# Patient Record
Sex: FEMALE | Race: WHITE | Employment: PART TIME | URBAN - METROPOLITAN AREA
[De-identification: names, ages, dates, MRNs, and addresses within clinical notes are randomized per-mention and may not be internally consistent; named-entity substitution may affect disease eponyms.]

---

## 2022-03-11 ENCOUNTER — HOSPITAL ENCOUNTER (OUTPATIENT)
Dept: PHYSICAL THERAPY | Age: 57
Discharge: HOME | End: 2022-03-11
Payer: OTHER GOVERNMENT

## 2022-03-11 PROCEDURE — 97161 PT EVAL LOW COMPLEX 20 MIN: CPT

## 2022-03-11 PROCEDURE — 97110 THERAPEUTIC EXERCISES: CPT

## 2022-03-11 NOTE — PROGRESS NOTES
PHYSICAL THERAPY EVALUATION / DAILY TREATMENT      Patient Name: Sheba Suarez  Date: 3/11/2022  : 1965  [x] Patient  Verified  Payor:  / Plan: Susan Patient / Product Type:  /    In Time: 10:45  Out Time: 11:55  Total Treatment Time (min): 70  Visit #: 1 of 12    Medicare / Hedy Patricio Only   Total Timed Codes (min):  40 1:1 Treatment Time:  70     Treatment Area: Pain in right knee [M25.561]    SUBJECTIVE:  Chief Complaint/Mechanism of Injury:   Patient is a very pleasant 61 y.o. female with c/o right knee pain and distal right thigh that began about 10 years ago after a soccer incident in which she fractured her distal right femur, but her pain has worsened over the last 1-2 years. Patient describes her former femur fracture as \"splitting from the bottom up\" and states it was treated non-surgically. Patient c/o pain and/or difficulty with walking > 20 mins, going up/down stairs (descents are more difficult and she does step to pattern), and sit <> stands. Patient states she has to walk at a slower pace because of her pain. Patient also states she feels like her balance has gotten worse over the past year, but she denies any falls though she does report several stumbles over the past year. She also reports difficulty with rolling over in bed, stating she's not able to tolerate pressure along the inside of her right knee. Static standing, such as when she's waiting in a grocery store line, is also painful and she states she can only tolerate about 20 minutes as well. 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 Patient states doing transfers with her client does tend to bother her right knee as well as her lower back. Her hobbies include yoga, pilates, and doing low-impact workout to fitness DVD's. Patient states she tries to workout 3x/week.   Patient states she received an injection about a month ago, which she states has helped with some of the inflammation as well as some of the pain. Pain Level (out of 0-10 scale): pain ranges from 3-8, currently 5/10  [x] constant, [] intermittent, [] improving, [x] worsening, [] no change since onset  Alleviating Factors: icing, Aleve or Advil prn, topical creams  Previous Treatment for Current Injury: prior skilled PT many years ago, compression stocking, different knee braces, injection last month  Diagnostic Imaging for Current Injury: x-rays - \"bone on bone\" along medial right knee  Living Situation: lives with family in a 2 story home with 0 steps to enter, [] with handrail  Hand Dominance: [x] right handed, [] left handed, [] ambidextrous    Comorbidities: right distal femur fracture 10 years ago (treated non-surgically), chronic right knee pain, left tibial shaft fracture 11 years ago (treated non-surgically), hypothyroid, bilateral carpal tunnel release, scoliosis, chronic LBP, partial thyroidectomy  Substance Use: [] tobacco use, [x] alcohol use, [] other:   Prior Level of Function: right knee pain/distal thigh pain x 10 year s/p right femur fracture; yoga, pilates, and doing low-impact workout to fitness DVD's 3x/week  [x] Unrestricted with functional activities and ADLs  [x] No assistive device  [] active lifestyle, [x] moderately active lifestyle, [] sedentary lifestyle    Patients Goals:  \"to strengthen my knee and leg, and hopefully reduce pain\"    Potential Barriers for PT: [] pain, [] financial, [] time, [] transportation, [] other:  Motivation: Good  Cognition: A&O x 3  Denies Red Flags: SOB, chest pain, dizziness/lightheadedness, blurred/double vision, HA, chills/fevers, night sweats, change in bowel/bladder control, abdominal pain, difficulty swallowing, slurred speech, unexplained weight gain/loss, nausea, and/or vomiting.   Any medication changes, allergies to medications, adverse drug reactions, diagnosis change, or new procedure performed?: [x] No     [] Yes (see summary sheet for update)      OBJECTIVE/EXAMINATION:    30 min [x] Eval                  [] Re-Evaluation     40 min Therapeutic Exercise:  [x] See flow sheet :   Rationale: patient education of knee anatomy and muscle strengthening; therapeutic exercises to improve LE strength, core stabilization, reduce pain, and improve overall function        With   [x] TE   [] TA   [] Neuro   [] Other: Patient Education: [x] Review HEP    [] Progressed/Changed HEP based on:   [] positioning   [] body mechanics   [] transfers   [] heat/ice application    [] other:      Physical Therapy Evaluation:    Inspection:   [x] Patient in no apparent distress                   [] Patient in obvious distress    Posture: fair posture with loss of cervical lordosis and forward shoulders, scoliosis with left lateral shift  [] kyphosis, [] scoliosis, [] lateral shift to the: [] right, [] left    Gait:  [x] No AD, [] SPC, [] Hurry Cane, [] FWW, [] RW, [] Rollator  [x] WNL  [] Abnormal Gait Mechanics    Palpation: TTP along right medial knee joint line and right MCL, otherwise normal palpation including normal patella mobility    AROM (degrees): WNL    MMT: Grossly 5/5 bilaterally except bilateral right quad 4+/5, hip abduction 4-/5, left hip ext with knee bent 4-/5, right hip ext with knee bent 4/5    Special Tests: (-) hamstring 90/90, (-) Nimco's; TUG test = 6 seconds and steady; 30\" STS test = 11 reps, without use of hands    Other Comments: none     Pain Level (out of 0-10 scale) Post Treatment: 5      ASSESSMENT:  Patient is a very pleasant 61 y.o. female presenting to skilled PT with c/o right knee pain and distal right thigh that began about 10 years ago after a soccer incident in which she fractured her distal right femur, but her pain has worsened over the last 1-2 years. Patient describes her former femur fracture as \"splitting from the bottom up\" and states it was treated non-surgically.   Patient c/o pain and/or difficulty with walking > 20 mins, going up/down stairs (descents are more difficult and she does step to pattern), and sit <> stands. Patient states she has to walk at a slower pace because of her pain. Patient also states she feels like her balance has gotten worse over the past year, but she denies any falls though she does report several stumbles over the past year. She also reports difficulty with rolling over in bed, stating she's not able to tolerate pressure along the inside of her right knee. Static standing, such as when she's waiting in a grocery store line, is also painful and she states she can only tolerate about 20 minutes as well. Patient presents today with mild weakness of her right quadriceps as well as moderate weakness of bilateral glutes, which is likely in contribution to her pain and limitations with functional activities as mentioned above. Patient would benefit from skilled PT services to modify and progress therapeutic interventions, address functional mobility deficits, address ROM deficits, address strength deficits, analyze and address soft tissue restrictions, analyze and cue movement patterns, analyze and modify body mechanics/ergonomics, and assess and modify postural abnormalities to attain remaining goals. Patient did well with today's evaluation and initiation of treatment. Patient was educated in [facility] and all questions were answered. An HEP was prescribed and reviewed with the patient today, see chart for copy of HEP. [x]  See Plan of Care  []  See Progress Note/Recertification  []  See Discharge Summary         GOALS:  Short Term Goals: to be accomplished within 2 weeks:    Patient will report compliance with prescribed HEP at least 1x/day in order to assist in progression towards goals and restore functional mobility.   Eval: HEP to be issued and explained to patient within 1-2 visits  Current: same as eval      Long Term Goals: to be accomplished within 6 weeks:    Patient will score at least 63 points on FOTO in order to maximize function and promote patient satisfaction with overall outcome. (Emily Boning is an established functional score where 100 = no disability)  Eval: 55  Current: same as eval    Patient will demonstrate at least 5/5 strength bilaterally in order to be able to safely perform functional activities. Eval: Grossly 5/5 bilaterally except bilateral right quad 4+/5, hip abduction 4-/5, left hip ext with knee bent 4-/5, right hip ext with knee bent 4/5  Current: same as eval    Patient will less than or equal to 2/10 pain during all functional activities and ADLs in order to improve QOL and return to patient's PLOF. Eval: pain ranges from 3-8, currently 5/10; limited in standing/walking > 20 mins, difficulty with sit <> stands and stair descents  Current: same as eval    Patient will be able to negotiate a full flight of stairs with a step-through pattern and less than or equal to 2/10 pain in order to improve safety and return patient to her PLOF. Eval: ascents step-through, descents step-to; up to 5/10 pain during descents   Current: same as eval    Patient will be able to perform at least 15 reps of sit <> stands during 30\" STS test to demonstrate improved LE strength and stability during this functional activity, thus reducing the patient's risk of falls. Eval: 11 reps, without use of arms   Current: same as eval        PLAN  [x]  Continue Plan of Care  []  Upgrade Activities as Tolerated       [x]  Update Interventions per Flow Sheet, as Tolerated by Patient       []  Discharge Due To:   []  Other: Thank you for the referral to [facility]. Karla Gloria, PT, DPT, ATR     3/11/2022     8:10 AM      Patient Name: Dick Jean     Patient : 1965

## 2022-03-11 NOTE — PROGRESS NOTES
[facility] at [address] [place] [address]  Phone: [phone]   Fax: [phone]    Plan of Care/ Statement of Necessity for Physical Therapy Services     Patient name: Radha Dick Start of Care: 3/11/2022   Referral source: Dianna Dowling MD : 1965    Medical Diagnosis: Pain in right knee [M25.561]   Onset Date:chronic x 10 years    Treatment Diagnosis: right knee patellofemoral syndrome/DJD   Prior Hospitalization: see medical history Provider#: [id number]   Medications: Verified on Patient summary List    right distal femur fracture 10 years ago (treated non-surgically), chronic right knee pain, left tibial shaft fracture 11 years ago (treated non-surgically), hypothyroid, bilateral carpal tunnel release, scoliosis, chronic LBP, partial thyroidectomy  Substance Use: []? tobacco use, [x]? alcohol use, []? other:   Prior Level of Function: right knee pain/distal thigh pain x 10 year s/p right femur fracture; yoga, pilates, and doing low-impact workout to fitness DVD's 3x/week  [x]? Unrestricted with functional activities and ADLs  [x]? No assistive device  []? active lifestyle, [x]? moderately active lifestyle, []? sedentary lifestyle     Patients Goals:  \"to strengthen my knee and leg, and hopefully reduce pain\"    The Plan of Care and following information is based on the information from the initial evaluation. Assessment/ key information: Patient is a very pleasant 61 y.o. female presenting to skilled PT with c/o right knee pain and distal right thigh that began about 10 years ago after a soccer incident in which she fractured her distal right femur, but her pain has worsened over the last 1-2 years. Patient describes her former femur fracture as \"splitting from the bottom up\" and states it was treated non-surgically.   Patient c/o pain and/or difficulty with walking > 20 mins, going up/down stairs (descents are more difficult and she does step to pattern), and sit <> stands. Patient states she has to walk at a slower pace because of her pain. Patient also states she feels like her balance has gotten worse over the past year, but she denies any falls though she does report several stumbles over the past year. She also reports difficulty with rolling over in bed, stating she's not able to tolerate pressure along the inside of her right knee. Static standing, such as when she's waiting in a grocery store line, is also painful and she states she can only tolerate about 20 minutes as well. Patient presents today with mild weakness of her right quadriceps as well as moderate weakness of bilateral glutes, which is likely in contribution to her pain and limitations with functional activities as mentioned above. Patient would benefit from skilled PT services to modify and progress therapeutic interventions, address functional mobility deficits, address ROM deficits, address strength deficits, analyze and address soft tissue restrictions, analyze and cue movement patterns, analyze and modify body mechanics/ergonomics, and assess and modify postural abnormalities to attain remaining goals.   Evaluation Complexity History MEDIUM  Complexity : 1-2 comorbidities / personal factors will impact the outcome/ POC ; Examination MEDIUM Complexity : 3 Standardized tests and measures addressing body structure, function, activity limitation and / or participation in recreation  ;Presentation LOW Complexity : Stable, uncomplicated  ;Clinical Decision Making MEDIUM Complexity : FOTO score of 26-74  Overall Complexity Rating: LOW   Problem List: pain affecting function, decrease strength, impaired gait/ balance, decrease ADL/ functional abilitiies, decrease activity tolerance and decrease transfer abilities   Treatment Plan may include any combination of the following: Therapeutic exercise, Therapeutic activities, Neuromuscular re-education, Physical agent/modality, Gait/balance training, Manual therapy, Aquatic therapy, Patient education, Functional mobility training, Home safety training and Stair training  Patient / Family readiness to learn indicated by: asking questions, trying to perform skills and interest  Persons(s) to be included in education: patient (P)  Barriers to Learning/Limitations: None  Measures taken if barriers to learning: n/a  Patient Self Reported Health Status: good  Rehabilitation Potential: good    Goals:  Short Term Goals: to be accomplished within 2 weeks:     Patient will report compliance with prescribed HEP at least 1x/day in order to assist in progression towards goals and restore functional mobility. Eval: HEP to be issued and explained to patient within 1-2 visits  Current: same as eval        Long Term Goals: to be accomplished within 6 weeks:     Patient will score at least 63 points on FOTO in order to maximize function and promote patient satisfaction with overall outcome. (Leanord Mario is an established functional score where 100 = no disability)  Eval: 55  Current: same as eval     Patient will demonstrate at least 5/5 strength bilaterally in order to be able to safely perform functional activities. Eval: Grossly 5/5 bilaterally except bilateral right quad 4+/5, hip abduction 4-/5, left hip ext with knee bent 4-/5, right hip ext with knee bent 4/5  Current: same as eval     Patient will less than or equal to 2/10 pain during all functional activities and ADLs in order to improve QOL and return to patient's PLOF. Eval: pain ranges from 3-8, currently 5/10; limited in standing/walking > 20 mins, difficulty with sit <> stands and stair descents  Current: same as eval     Patient will be able to negotiate a full flight of stairs with a step-through pattern and less than or equal to 2/10 pain in order to improve safety and return patient to her PLOF.               Eval: ascents step-through, descents step-to; up to 5/10 pain during descents Current: same as eval     Patient will be able to perform at least 15 reps of sit <> stands during 30\" STS test to demonstrate improved LE strength and stability during this functional activity, thus reducing the patient's risk of falls. Eval: 11 reps, without use of arms              Current: same as eval    Frequency / Duration: Patient to be seen 2 times per week for 6 weeks. Patient/ Caregiver education and instruction: Diagnosis, prognosis, exercises   [x]  Plan of care has been reviewed with PTA    Daryl Kerr, PT 3/11/2022 8:10 AM  _____________________________________________________________________  I certify that the above Therapy Services are being furnished while the patient is under my care. I agree with the treatment plan and certify that this therapy is necessary.     Physician's Signature:____________Date:_________TIME:________                                      Sunny Neal MD    ** Signature, Date and Time must be completed for valid certification **    Please sign and return to [facility] at [address]  Phone: [phone]   Fax: [phone]

## 2022-03-14 ENCOUNTER — HOSPITAL ENCOUNTER (OUTPATIENT)
Dept: PHYSICAL THERAPY | Age: 57
Discharge: HOME | End: 2022-03-14
Payer: OTHER GOVERNMENT

## 2022-03-14 PROCEDURE — 97530 THERAPEUTIC ACTIVITIES: CPT

## 2022-03-14 PROCEDURE — 97110 THERAPEUTIC EXERCISES: CPT

## 2022-03-14 NOTE — PROGRESS NOTES
PT DAILY TREATMENT NOTE    Patient Name: Veronica Ordoñez  Date: 3/14/2022  : 1965  [x]  Patient  Verified  Payor:  / Plan: Jerome Lugo 73 / Product Type:  /    In Time: 10:16  Out Time: 11:08  Total Treatment Time (min): 52  Total Timed Codes (min): 52  1:1 Treatment Time ( [address] only): 55   Visit #:     Treatment Dx: Pain in right knee [M25.561]    SUBJECTIVE  Pre-Treatment Pain Level (0-10 scale): 7    Any medication changes, allergies to medications, adverse drug reactions, diagnosis change, or new procedure performed?: [x] No    [] Yes (see summary sheet for update)    Subjective Functional Status/Changes:  [] No changes reported  Patient states her left knee is a slight bit more painful today. OBJECTIVE    30 min Therapeutic Exercise:  [x] See flow sheet   Rationale: increase ROM, increase strength, and decrease pain to assist in improving patient's ability to perform functional activities and ADLs    12 min Therapeutic Activity:  [x] See flow sheet   Rationale: increase ROM, increase strength, and improve coordination to assist in improving patient's ability to perform functional activities and ADLs    4 min Neuromuscular Re-Education:  [x] See flow sheet   Rationale: improve coordination, improve balance/proprioception, improve posture, and improve core stabilization to assist in improving patient's ability to perform functional activities and ADLs as well as to improve core stabilization during static/dynamic movements      With   [x] TE   [] TA   [] neuro   [] other: Patient Education: [x] Review HEP    [] Progressed/Changed HEP based on:   [] positioning   [] body mechanics   [] transfers   [] heat/ice application    [] other:      Other Objective/Functional Measures: N/A     Pain Level (0-10 scale) Post Treatment: 5    ASSESSMENT/Changes in Function:  Patient responded well to today's treatment without any adverse reactions.   Patient did well with today's exercise additions, including demonstrating good balance/control during SLS and tandem stances. Decreased pain reported post-treatment. Patient will continue to benefit from skilled PT services to further modify and progress therapeutic interventions, address functional mobility deficits, address ROM deficits, address strength deficits, analyze and address soft tissue restrictions, analyze and cue movement patterns, analyze and modify body mechanics/ergonomics, assess and modify postural abnormalities, and instruct in home and community integration to attain remaining goals. [x]  See Plan of Care  []  See Progress Note/Recertification  []  See Discharge Summary         Progress Towards Goals/Updated Goals:    Short Term Goals: to be accomplished within 2 weeks:     Patient will report compliance with prescribed HEP at least 1x/day in order to assist in progression towards goals and restore functional mobility. Eval: HEP to be issued and explained to patient within 1-2 visits  Current: Patient report daily compliance with her HEP.     3/14/22, met        Long Term Goals: to be accomplished within 6 weeks:     Patient will score at least 63 points on FOTO in order to maximize function and promote patient satisfaction with overall outcome.  (FOTO is an established functional score where 100 = no disability)  Eval: 55  Current: same as eval     Patient will demonstrate at least 5/5 strength bilaterally in order to be able to safely perform functional activities. Eval: Grossly 5/5 bilaterally except bilateral right quad 4+/5, hip abduction 4-/5, left hip ext with knee bent 4-/5, right hip ext with knee bent 4/5  Current: same as eval     Patient will less than or equal to 2/10 pain during all functional activities and ADLs in order to improve QOL and return to patient's PLOF.   Eval: pain ranges from 3-8, currently 5/10; limited in standing/walking > 20 mins, difficulty with sit <> stands and stair descents  Current: same as eval     Patient will be able to negotiate a full flight of stairs with a step-through pattern and less than or equal to 2/10 pain in order to improve safety and return patient to her PLOF.             Eval: ascents step-through, descents step-to; up to 5/10 pain during descents              Current: same as eval     Patient will be able to perform at least 15 reps of sit <> stands during 30\" STS test to demonstrate improved LE strength and stability during this functional activity, thus reducing the patient's risk of falls.              Eval: 11 reps, without use of arms              PHELQRW: same as eval    PLAN  []  Upgrade Activities as Tolerated     [x]  Continue Plan of Care  []  Update Interventions per Flow Sheet       []  Discharge Due To:_  []  Other:_      Karla Moscoso.  Juani PT, DPT, ATR  3/14/2022 8:09 AM    Future Appointments   Date Time Provider Toña Cheema   3/14/2022 10:15 AM Karla Gloria PT [facility]   3/17/2022  3:15 PM [facility] [facility]   3/22/2022  1:00 PM EZEQUIEL Rosales [facility]   3/24/2022  3:15 PM Karla Gloria PT [facility]   3/28/2022 11:00 AM Karla Gloria PT [facility] [facility]   3/30/2022 11:45 AM Wendy Sparks PT [facility]

## 2022-03-17 ENCOUNTER — HOSPITAL ENCOUNTER (OUTPATIENT)
Dept: PHYSICAL THERAPY | Age: 57
Discharge: HOME | End: 2022-03-17
Payer: OTHER GOVERNMENT

## 2022-03-17 PROCEDURE — 97110 THERAPEUTIC EXERCISES: CPT

## 2022-03-17 PROCEDURE — 97530 THERAPEUTIC ACTIVITIES: CPT

## 2022-03-17 PROCEDURE — 97112 NEUROMUSCULAR REEDUCATION: CPT

## 2022-03-17 NOTE — PROGRESS NOTES
PT DAILY TREATMENT NOTE - [facility]     Patient Name: Tatiana Torres  Date:3/17/2022  : 1965  [x]  Patient  Verified  Payor:  / Plan: Jerome Lugo 73 / Product Type:  /    In time:1515  Out time:1615  Total Treatment Time (min): 60  Total Timed Codes (min): 60   1:1 Treatment Time ([facility] only): 60   Visit #: 3 of 12    Treatment Area: Pain in right knee [M25.561]    SUBJECTIVE  Pain Level (0-10 scale): 3-4  Any medication changes, allergies to medications, adverse drug reactions, diagnosis change, or new procedure performed?: [x] No    [] Yes (see summary sheet for update)  Subjective functional status/changes:   [] No changes reported    Patient reports compliance with initial HEP. OBJECTIVE    35 min Therapeutic Exercise:  [x] See flow sheet :   Rationale: increase ROM, increase strength and decrease pain to improve the patients ability to complete ADLs    15 min Therapeutic Activity:  [x]  See flow sheet :   Rationale: increase ROM, increase strength and improve coordination  to improve the patients ability to complete ADLs     10 min Neuromuscular Re-education:  [x]  See flow sheet :   Rationale: improve coordination, improve balance and increase proprioception  to improve the patients ability to complete ADLs          With   [x] TE   [] TA   [] neuro   [] other: Patient Education: [x] Review HEP    [] Progressed/Changed HEP based on:   [] positioning   [] body mechanics   [] transfers   [] heat/ice application    [] other:      Other Objective/Functional Measures: NA     Pain Level (0-10 scale) post treatment: 3    ASSESSMENT/Changes in Function: Patient responds well to treatment session. Provided visual feedback via mirror during sit to stand exercise to promote even weight distribution. She responded well to visual feedback demonstrating improved mechanics. Patient was challenged with activities that recruit posterior chain.  Patient demonstrated reduced strength of the right gastroc. Introduced calf raises added them to HEP. Will advance exercise as tolerated. No adverse effects were noted from today's treatment session     Patient will continue to benefit from skilled PT services to modify and progress therapeutic interventions, address functional mobility deficits, address ROM deficits, address strength deficits, analyze and address soft tissue restrictions, analyze and cue movement patterns, analyze and modify body mechanics/ergonomics, assess and modify postural abnormalities, address imbalance and instruct in home and community integration to attain remaining goals. []  See Plan of Care  []  See progress note/recertification  []  See Discharge Summary         Progress towards goals / Updated goals:  Short Term Goals: to be accomplished within 2 weeks:     Patient will report compliance with prescribed HEP at least 1x/day in order to assist in progression towards goals and restore functional mobility. Eval: HEP to be issued and explained to patient within 1-2 visits  Current: Patient report daily compliance with her HEP.     3/14/22, met        Long Term Goals: to be accomplished within 6 weeks:     Patient will score at least 63 points on FOTO in order to maximize function and promote patient satisfaction with overall outcome.  (FOTO is an established functional score where 100 = no disability)  Eval: 55  Current: same as eval     Patient will demonstrate at least 5/5 strength bilaterally in order to be able to safely perform functional activities. Eval: Grossly 5/5 bilaterally except bilateral right quad 4+/5, hip abduction 4-/5, left hip ext with knee bent 4-/5, right hip ext with knee bent 4/5  Current: same as eval     Patient will less than or equal to 2/10 pain during all functional activities and ADLs in order to improve QOL and return to patient's PLOF.   Eval: pain ranges from 3-8, currently 5/10; limited in standing/walking > 20 mins, difficulty with sit <> stands and stair descents  Current: same as eval     Patient will be able to negotiate a full flight of stairs with a step-through pattern and less than or equal to 2/10 pain in order to improve safety and return patient to her PLOF.               Eval: ascents step-through, descents step-to; up to 5/10 pain during descents              Current: same as eval     Patient will be able to perform at least 15 reps of sit <> stands during 30\" STS test to demonstrate improved LE strength and stability during this functional activity, thus reducing the patient's risk of falls.              Eval: 11 reps, without use of arms              HARKPQU: same as eval    PLAN  []  Upgrade activities as tolerated     [x]  Continue plan of care  []  Update interventions per flow sheet       []  Discharge due to:_  []  Other:_      Shakira Driscoll, PT, DPT 3/17/2022  3:15 PM    Future Appointments   Date Time Provider Toña Cheema   3/22/2022  1:00 PM Phi Doll PT [facility]   3/24/2022  3:15 PM Mallory Gloria, PT [facility]   3/30/2022 11:45 AM Ginette Zurita PT [facility]   4/1/2022  2:30 PM Mallory Gloria, PT [facility]   4/5/2022  3:15 PM Phi Doll PT [facility]   4/7/2022  3:15 PM Mallory Gloria, PT [facility]   4/12/2022  3:15 PM Phi Doll PT [facility]

## 2022-03-22 ENCOUNTER — HOSPITAL ENCOUNTER (OUTPATIENT)
Dept: PHYSICAL THERAPY | Age: 57
Discharge: HOME | End: 2022-03-22
Payer: OTHER GOVERNMENT

## 2022-03-22 PROCEDURE — 97530 THERAPEUTIC ACTIVITIES: CPT

## 2022-03-22 PROCEDURE — 97110 THERAPEUTIC EXERCISES: CPT

## 2022-03-22 NOTE — PROGRESS NOTES
PT DAILY TREATMENT NOTE - [facility]     Patient Name: Yuri Gibbs  Date:3/22/2022  : 1965  [x]  Patient  Verified  Payor: LORENA / Plan: Aure Zendejas / Product Type:  /    In time:1300  Out time:1345  Total Treatment Time (min): 45  Total Timed Codes (min): 45   Visit #: 4 of 12    Treatment Area: Pain in right knee [M25.561]    SUBJECTIVE  Pain Level (0-10 scale): 5  Any medication changes, allergies to medications, adverse drug reactions, diagnosis change, or new procedure performed?: [x] No    [] Yes (see summary sheet for update)  Subjective functional status/changes:   [] No changes reported    Patient reports that she has more knee pain today. OBJECTIVE    30 min Therapeutic Exercise:  [x] See flow sheet :   Rationale: increase ROM, increase strength and decrease pain to improve the patients ability to complete ADLs    15 min Therapeutic Activity:  [x]  See flow sheet :   Rationale: increase ROM, increase strength and improve coordination  to improve the patients ability to complete ADLs        With   [x] TE   [] TA   [] neuro   [] other: Patient Education: [x] Review HEP    [] Progressed/Changed HEP based on:   [] positioning   [] body mechanics   [] transfers   [] heat/ice application    [] other:      Other Objective/Functional Measures: NA     Pain Level (0-10 scale) post treatment: 0    ASSESSMENT/Changes in Function: Patient responds well to treatment session. Patient had c/o anterior knee pain with bridges. Had her perform hip adduction with bridge and she had fewer symptoms. Reviewed hoe walking program and provided handout. Will advance exercise as tolerated.  No adverse effects were noted from today's treatment session      Patient will continue to benefit from skilled PT services to modify and progress therapeutic interventions, address functional mobility deficits, address ROM deficits, address strength deficits, analyze and address soft tissue restrictions, analyze and cue movement patterns, analyze and modify body mechanics/ergonomics, assess and modify postural abnormalities, address imbalance and instruct in home and community integration to attain remaining goals. []  See Plan of Care  []  See progress note/recertification  []  See Discharge Summary         Progress towards goals / Updated goals:  Short Term Goals: to be accomplished within 2 weeks:     Patient will report compliance with prescribed HEP at least 1x/day in order to assist in progression towards goals and restore functional mobility. Eval: HEP to be issued and explained to patient within 1-2 visits  Current: Patient report daily compliance with her HEP.     3/14/22, met        Long Term Goals: to be accomplished within 6 weeks:     Patient will score at least 63 points on FOTO in order to maximize function and promote patient satisfaction with overall outcome.  (FOTO is an established functional score where 100 = no disability)  Eval: 55  Current: same as eval     Patient will demonstrate at least 5/5 strength bilaterally in order to be able to safely perform functional activities. Eval: Grossly 5/5 bilaterally except bilateral right quad 4+/5, hip abduction 4-/5, left hip ext with knee bent 4-/5, right hip ext with knee bent 4/5  Current: same as eval     Patient will less than or equal to 2/10 pain during all functional activities and ADLs in order to improve QOL and return to patient's PLOF. Eval: pain ranges from 3-8, currently 5/10; limited in standing/walking > 20 mins, difficulty with sit <> stands and stair descents  Current: same as eval     Patient will be able to negotiate a full flight of stairs with a step-through pattern and less than or equal to 2/10 pain in order to improve safety and return patient to her PLOF.               Eval: ascents step-through, descents step-to; up to 5/10 pain during descents              Current: same as eval     Patient will be able to perform at least 15 reps of sit <> stands during 30\" STS test to demonstrate improved LE strength and stability during this functional activity, thus reducing the patient's risk of falls.              Eval: 11 reps, without use of arms              Current: same as eval    PLAN  []  Upgrade activities as tolerated     [x]  Continue plan of care  []  Update interventions per flow sheet       []  Discharge due to:_  []  Other:_      Abdiel Felipe, PT, DPT 3/22/2022  12:47 PM    Future Appointments   Date Time Provider Toña Cheema   3/22/2022  1:00 PM Selestine Copa, PT [facility]   3/24/2022  3:15 PM Cesario Gloria, PT [facility]   3/30/2022 11:45 AM George Desai, PT [facility]   4/1/2022  2:30 PM Cesario Gloria, PT [facility]   4/5/2022  3:15 PM Selestine Copa, PT [facility]   4/7/2022  3:15 PM Cesario Gloria, PT [facility]   4/12/2022  3:15 PM Selestine Armidaa, PT [facility]

## 2022-03-24 ENCOUNTER — HOSPITAL ENCOUNTER (OUTPATIENT)
Dept: PHYSICAL THERAPY | Age: 57
Discharge: HOME | End: 2022-03-24
Payer: OTHER GOVERNMENT

## 2022-03-24 PROCEDURE — 97110 THERAPEUTIC EXERCISES: CPT

## 2022-03-24 PROCEDURE — 97112 NEUROMUSCULAR REEDUCATION: CPT

## 2022-03-24 PROCEDURE — 97530 THERAPEUTIC ACTIVITIES: CPT

## 2022-03-24 NOTE — PROGRESS NOTES
PT DAILY TREATMENT NOTE    Patient Name: Precious Weaver  Date: 3/24/2022  : 1965  [x]  Patient  Verified  Payor:  / Plan: [employer] / Product Type:  /    In Time: 4:46  Out Time: 5:35  Total Treatment Time (min): 49  Total Timed Codes (min): 49  1:1 Treatment Time ( [address] only): 55   Visit #:     Treatment Dx: Pain in right knee [M25.561]    SUBJECTIVE  Pre-Treatment Pain Level (0-10 scale): 5    Any medication changes, allergies to medications, adverse drug reactions, diagnosis change, or new procedure performed?: [x] No    [] Yes (see summary sheet for update)    Subjective Functional Status/Changes:  [] No changes reported  Patient states she can now negotiate a full flight of stairs with a step-through pattern. However, she continues to have increased right knee pain when walking around her neighborhood for about a 1/4 mile. Patient states she overall feels about 25% improvement thus far since starting physical therapy.     OBJECTIVE    23 min Therapeutic Exercise:  [x] See flow sheet   Rationale: increase ROM, increase strength, and decrease pain to assist in improving patient's ability to perform functional activities and ADLs     15 min Therapeutic Activity:  [x] See flow sheet   Rationale: increase ROM, increase strength, and improve coordination to assist in improving patient's ability to perform functional activities and ADLs    8 min Neuromuscular Re-Education:  [x] See flow sheet   Rationale: improve coordination, improve balance/proprioception, improve posture, and improve core stabilization to assist in improving patient's ability to perform functional activities and ADLs as well as to improve core stabilization during static/dynamic movements      With   [x] TE   [] TA   [] neuro   [] other: Patient Education: [x] Review HEP    [] Progressed/Changed HEP based on:   [] positioning   [] body mechanics   [] transfers   [] heat/ice application    [] other: Other Objective/Functional Measures: N/A     Pain Level (0-10 scale) Post Treatment: 3    ASSESSMENT/Changes in Function:  Patient responded well to today's treatment without any adverse reactions. Patient is with reports of improving function during stair negotiations and is making steady progress towards goals. She was able to transition onto an Airex pad during SLS bilaterally today with minimal sway noted. Bilateral LE strength appears to be progressing as noted by bettering performance and/or higher resistance/weights during exercises. Patient will continue to benefit from skilled PT services to further modify and progress therapeutic interventions, address functional mobility deficits, address ROM deficits, address strength deficits, analyze and address soft tissue restrictions, analyze and cue movement patterns, analyze and modify body mechanics/ergonomics, assess and modify postural abnormalities, and instruct in home and community integration to attain remaining goals. [x]  See Plan of Care  []  See Progress Note/Recertification  []  See Discharge Summary         Progress Towards Goals/Updated Goals:    Short Term Goals: to be accomplished within 2 weeks:    Patient will report compliance with prescribed HEP at least 1x/day in order to assist in progression towards goals and restore functional mobility. Eval: HEP to be issued and explained to patient within 1-2 visits  Current: Patient report daily compliance with her HEP.     3/14/22, met     Long Term Goals: to be accomplished within 6 weeks:     Patient will score at least 63 points on FOTO in order to maximize function and promote patient satisfaction with overall outcome.  (FOTO is an established functional score where 100 = no disability)  Eval: 55  Current: same as eval     Patient will demonstrate at least 5/5 strength bilaterally in order to be able to safely perform functional activities.   Eval: Grossly 5/5 bilaterally except bilateral right quad 4+/5, hip abduction 4-/5, left hip ext with knee bent 4-/5, right hip ext with knee bent 4/5  Current: same as eval     Patient will less than or equal to 2/10 pain during all functional activities and ADLs in order to improve QOL and return to patient's PLOF. Eval: pain ranges from 3-8, currently 5/10; limited in standing/walking > 20 mins, difficulty with sit <> stands and stair descents  Current: same as eval     Patient will be able to negotiate a full flight of stairs with a step-through pattern and less than or equal to 2/10 pain in order to improve safety and return patient to her PLOF.             Eval: ascents step-through, descents step-to; up to 5/10 pain during descents              Current: can now negotiate a full flight of stairs with a step-through pattern, but with 4/10 pain level     3/24/22, in progress     Patient will be able to perform at least 15 reps of sit <> stands during 30\" STS test to demonstrate improved LE strength and stability during this functional activity, thus reducing the patient's risk of falls.              Eval: 11 reps, without use of arms              [id number]: same as eval    PLAN  []  Upgrade Activities as Tolerated     [x]  Continue Plan of Care  []  Update Interventions per Flow Sheet       []  Discharge Due To:_  []  Other:_      Maynor Dunham.  EZEQUIEL Gloria, DPT, [address] [place] [address]  3/24/2022 3:17 PM    Future Appointments   Date Time Provider Toña Cheema   3/24/2022  4:45 PM Tran Mcguire Im, PT [facility]   3/30/2022 11:45 AM EZEQUIEL BerriosHPPATRICIA [facility]   4/1/2022  2:30 PM Maynor Gloria, PT [facility]   4/5/2022  3:15 PM Raquel Mckoy PT [facility]   4/7/2022  3:15 PM Maynor Gloria Im, PT [facility]   4/12/2022  3:15 PM Raquel Mckoy, PT [facility]

## 2022-03-28 ENCOUNTER — APPOINTMENT (OUTPATIENT)
Dept: PHYSICAL THERAPY | Age: 57
End: 2022-03-28
Payer: OTHER GOVERNMENT

## 2022-03-30 ENCOUNTER — HOSPITAL ENCOUNTER (OUTPATIENT)
Dept: PHYSICAL THERAPY | Age: 57
Discharge: HOME | End: 2022-03-30
Payer: OTHER GOVERNMENT

## 2022-03-30 PROCEDURE — 97530 THERAPEUTIC ACTIVITIES: CPT

## 2022-03-30 PROCEDURE — 97110 THERAPEUTIC EXERCISES: CPT

## 2022-03-30 PROCEDURE — 97112 NEUROMUSCULAR REEDUCATION: CPT

## 2022-03-30 NOTE — PROGRESS NOTES
PT DAILY TREATMENT NOTE    Patient Name: Karthikeyan Siu  Date:3/30/2022  : 1965  [x]  Patient  Verified  Payor: LORENA / Plan: Jerome Lugo 74 / Product Type:  /    In time: 4482  Out time: 3886  Total Treatment Time (min): 59  Total Timed Codes (min): 59  1:1 Treatment Time ( only): 47   Visit #: 6 of 12    Treatment Dx: Pain in right knee [M25.561]    SUBJECTIVE  Pain Level (0-10 scale): 4  Any medication changes, allergies to medications, adverse drug reactions, diagnosis change, or new procedure performed?: [x] No    [] Yes (see summary sheet for update)  Subjective functional status/changes:   [] No changes reported  \"The knee is still sore, but not as much as it was. \"    OBJECTIVE    30 min Therapeutic Exercise:  [x] See flow sheet :   Rationale: increase ROM, increase strength and decrease pain to improve the patients ability to complete ADLs  ambulation safety and efficiency in order to improve patient's ability to safely ambulate at home for self care. 14 min Therapeutic Activity:  [x]  See flow sheet :   Rationale: increase ROM, increase strength and improve coordination  to improve the patients ability to Complete ADLS     10 min Neuromuscular Re-education:  [x]  See flow sheet :   Rationale: improve coordination, improve balance and increase proprioception  to improve the patients ability to complete ADLS, and decrease falls risk    With   [] TE   [] TA   [] neuro   [] other: Patient Education: [x] Review HEP    [] Progressed/Changed HEP based on:   [] positioning   [] body mechanics   [] transfers   [] heat/ice application    [] other:      Other Objective/Functional Measures: NA     Pain Level (0-10 scale) post treatment: 3-4    ASSESSMENT/Changes in Function: Patient has tendency to perform exercises rapidly and does respond well to cueing to decrease pace and increase control. Patient responds well to treatment session.   No adverse effects were noted from today's treatment session. Patient will continue to benefit from skilled PT services to modify and progress therapeutic interventions, address functional mobility deficits, address ROM deficits, address strength deficits, analyze and address soft tissue restrictions, analyze and cue movement patterns, analyze and modify body mechanics/ergonomics, assess and modify postural abnormalities,  and instruct in home and community integration to attain remaining goals. [x]  See Plan of Care  []  See progress note/recertification  []  See Discharge Summary         Progress towards goals / Updated goals:  Short Term Goals: to be accomplished within 2 weeks:     Patient will report compliance with prescribed HEP at least 1x/day in order to assist in progression towards goals and restore functional mobility. Eval: HEP to be issued and explained to patient within 1-2 visits  Current: Patient report daily compliance with her HEP.     3/14/22, met     Long Term Goals: to be accomplished within 6 weeks:     Patient will score at least 63 points on FOTO in order to maximize function and promote patient satisfaction with overall outcome.  (FOTO is an established functional score where 100 = no disability)  Eval: 55  Current: remains 55, but verbally describes functional improvement. 3/30/2022, in progress     Patient will demonstrate at least 5/5 strength bilaterally in order to be able to safely perform functional activities. Eval: Grossly 5/5 bilaterally except bilateral right quad 4+/5, hip abduction 4-/5, left hip ext with knee bent 4-/5, right hip ext with knee bent 4/5  Current: same as eval     Patient will less than or equal to 2/10 pain during all functional activities and ADLs in order to improve QOL and return to patient's PLOF.   Eval: pain ranges from 3-8, currently 5/10; limited in standing/walking > 20 mins, difficulty with sit <> stands and stair descents  Current: same as eval     Patient will be able to negotiate a full flight of stairs with a step-through pattern and less than or equal to 2/10 pain in order to improve safety and return patient to her PLOF.               Eval: ascents step-through, descents step-to; up to 5/10 pain during descents              Current: can now negotiate a full flight of stairs with a step-through pattern, but with 4/10 pain level     3/24/22, in progress     Patient will be able to perform at least 15 reps of sit <> stands during 30\" STS test to demonstrate improved LE strength and stability during this functional activity, thus reducing the patient's risk of falls.              Eval: 11 reps, without use of arms              EPRAIGA: same as eval    PLAN  []  Upgrade activities as tolerated     [x]  Continue plan of care  []  Update interventions per flow sheet       []  Discharge due to:_  []  Other:_      Brenna Shah, PT 3/30/2022  12:15 PM    Future Appointments   Date Time Provider Toña Cheema   4/1/2022  2:30 PM Elvia Gloria, EZEQUIEL SANDRA [facility]   4/5/2022  3:15 PM EZEQUIEL MckeonTNARDA [facility]   4/7/2022  3:15 PM Elvia Gloria, EZEQUIEL SANDRA [facility]   4/12/2022  3:15 PM EZEQUIEL MckeonTNARDA [facility]

## 2022-04-05 ENCOUNTER — HOSPITAL ENCOUNTER (OUTPATIENT)
Dept: PHYSICAL THERAPY | Age: 57
Discharge: HOME | End: 2022-04-05
Payer: OTHER GOVERNMENT

## 2022-04-05 PROCEDURE — 97530 THERAPEUTIC ACTIVITIES: CPT

## 2022-04-05 PROCEDURE — 97112 NEUROMUSCULAR REEDUCATION: CPT

## 2022-04-05 PROCEDURE — 97110 THERAPEUTIC EXERCISES: CPT

## 2022-04-05 NOTE — PROGRESS NOTES
PT DAILY TREATMENT NOTE - [facility]     Patient Name: Jaron Pina  Date:2022  : 1965  [x]  Patient  Verified  Payor:  / Plan: Jerome Lugo 73 / Product Type:  /    In time:1315  Out time:1610  Total Treatment Time (min): 55  Total Timed Codes (min): 55     Visit #: 8 of 15    Treatment Area: Pain in right knee [M25.561]    SUBJECTIVE  Pain Level (0-10 scale): 5  Any medication changes, allergies to medications, adverse drug reactions, diagnosis change, or new procedure performed?: [x] No    [] Yes (see summary sheet for update)  Subjective functional status/changes:   [] No changes reported    Patient reports that she climbed a lot of stairs yesterday and she had a lot of knee pain in the evening. She reports that she has less pain today but still has soreness. OBJECTIVE    35 min Therapeutic Exercise:  [x] See flow sheet :   Rationale: increase ROM, increase strength and decrease pain to improve the patients ability to complete ADLs    10 min Therapeutic Activity:  [x]  See flow sheet :   Rationale: increase ROM, increase strength and improve coordination  to improve the patients ability to complete ADLs     10 min Neuromuscular Re-education:  [x]  See flow sheet :   Rationale: improve coordination, improve balance and increase proprioception  to improve the patients ability to complete ADLs          With   [x] TE   [] TA   [] neuro   [] other: Patient Education: [x] Review HEP    [] Progressed/Changed HEP based on:   [] positioning   [] body mechanics   [] transfers   [] heat/ice application    [] other:      Other Objective/Functional Measures: NA     Pain Level (0-10 scale) post treatment: 2    ASSESSMENT/Changes in Function: Patient responds well to treatment session. Advanced exercise intensity as patient is progressing well. She was challenged with dynamic stability via BOSU ball activities. Provided cues to promote proper joint mechanics with TRX squats.  Will continue to advance exercise as tolerated. No adverse effects were noted from today's treatment session    Patient will continue to benefit from skilled PT services to modify and progress therapeutic interventions, address functional mobility deficits, address ROM deficits, address strength deficits, analyze and address soft tissue restrictions, analyze and cue movement patterns, analyze and modify body mechanics/ergonomics, assess and modify postural abnormalities, address imbalance and instruct in home and community integration to attain remaining goals. []  See Plan of Care  []  See progress note/recertification  []  See Discharge Summary         Progress towards goals / Updated goals:     Short Term Goals: to be accomplished within 2 weeks:     Patient will report compliance with prescribed HEP at least 1x/day in order to assist in progression towards goals and restore functional mobility. Eval: HEP to be issued and explained to patient within 1-2 visits  Current: Patient report daily compliance with her HEP.     3/14/22, met     Long Term Goals: to be accomplished within 6 weeks:     Patient will score at least 63 points on FOTO in order to maximize function and promote patient satisfaction with overall outcome.  (FOTO is an established functional score where 100 = no disability)  Eval: 55  Current: remains 55, but patient verbally describes functional improvement       3/30/22, in progress     Patient will demonstrate at least 5/5 strength bilaterally in order to be able to safely perform functional activities.   Eval: Grossly 5/5 bilaterally except bilateral right quad 4+/5, hip abduction 4-/5, left hip ext with knee bent 4-/5, right hip ext with knee bent 4/5  Current: grossly 5/5 bilaterally except right hip abduction 4-/5, left hip abduction 4/5    4/1/22, in progress     Patient will less than or equal to 2/10 pain during all functional activities and ADLs in order to improve QOL and return to patient's PLOF. Eval: pain ranges from 3-8, currently 5/10; limited in standing/walking > 20 mins, difficulty with sit <> stands and stair descents  Current: pain ranges from 3-7/10, able to stand/walk up to 30 minutes, improving ability with sit <> stands and stair descents     4/1/22, in progress     Patient will be able to negotiate a full flight of stairs with a step-through pattern and less than or equal to 2/10 pain in order to improve safety and return patient to her PLOF.             Eval: ascents step-through, descents step-to; up to 5/10 pain during descents              Current: can now negotiate a full flight of stairs with a step-through pattern, but with 3/10 pain level     4/1/22, in progress     Patient will be able to perform at least 15 reps of sit <> stands during 30\" STS test to demonstrate improved LE strength and stability during this functional activity, thus reducing the patient's risk of falls.              Eval: 11 reps, without use of arms              HYISJAJ: 12 reps, without use of arm     4/1/22, in progress     Summary of Care/ Key Functional Changes:   Patient responded well to today's treatment without any adverse reactions. Patient has participated in 7 visits of skilled PT over the past 3 weeks, and is making steady progress towards goals. Patient does still c/o right knee pain that ranges from 3-7/10, particularly during prolonged walking/standing and stair negotiations, but pain is gradually decreasing. Her bilateral LE strength has improved, albeit she does still have weakness of her core, proximal hips, and glutes. Patient would continue to benefit from skilled PT 2x/week x 4 weeks to further improve LE strength, core stabilization, walking/stair tolerance and ability, balance, and overall function, while decreasing pain.      Patient will continue to benefit from skilled PT services to further modify and progress therapeutic interventions, address functional mobility deficits, address ROM deficits, address strength deficits, analyze and address soft tissue restrictions, analyze and cue movement patterns, analyze and modify body mechanics/ergonomics, assess and modify postural abnormalities, and instruct in home and community integration to attain remaining goals.     PLAN  []  Upgrade activities as tolerated     [x]  Continue plan of care  []  Update interventions per flow sheet       []  Discharge due to:_  []  Other:_      Sondra Lemus, PT, DPT 4/5/2022  3:06 PM    Future Appointments   Date Time Provider Toña Cheema   4/5/2022  3:15 PM Gina Herrera, PT BO[facility]   4/7/2022  3:15 PM David Gloria, EZEQUIEL SANDRA [facility]   4/12/2022  3:15 PM [facility]   4/15/2022 12:30 PM [facility] PT VICTORY [facility]   4/19/2022 11:00 AM EZEQUIEL Garrido [facility]   4/22/2022  2:30 PM Eusebio Segovia [facility]   4/26/2022  4:00 PM Gina Herrera, PT BOAZ [facility]   4/29/2022  3:15 PM Ynes King THE [facility]

## 2022-04-07 ENCOUNTER — HOSPITAL ENCOUNTER (OUTPATIENT)
Dept: PHYSICAL THERAPY | Age: 57
Discharge: HOME | End: 2022-04-07
Payer: OTHER GOVERNMENT

## 2022-04-07 PROCEDURE — 97112 NEUROMUSCULAR REEDUCATION: CPT

## 2022-04-07 PROCEDURE — 97530 THERAPEUTIC ACTIVITIES: CPT

## 2022-04-07 PROCEDURE — 97110 THERAPEUTIC EXERCISES: CPT

## 2022-04-07 NOTE — PROGRESS NOTES
PT DAILY TREATMENT NOTE    Patient Name: Jaron Pina  Date: 2022  : 1965  [x]  Patient  Verified  Payor:  / Plan: Jerome Lugo 73 / Product Type:  /    In Time: 3:16  Out Time: 4:13  Total Treatment Time (min): 57  Total Timed Codes (min): 57  1:1 Treatment Time ( only): 48   Visit #: 9/15    Treatment Dx: Pain in right knee [M25.561]    SUBJECTIVE  Pre-Treatment Pain Level (0-10 scale): 3    Any medication changes, allergies to medications, adverse drug reactions, diagnosis change, or new procedure performed?: [x] No    [] Yes (see summary sheet for update)    Subjective Functional Status/Changes:  [] No changes reported  \"I did a lot of going up and down stairs this past , so Monday I was in like an 8 out of 10 pain. I'm feeling fine now though. Stairs are pretty painful though. \"    OBJECTIVE    15 min Therapeutic Exercise:  [x] See flow sheet   Rationale: increase ROM, increase strength, and decrease pain to assist in improving patient's ability to perform functional activities and ADLs    23 min Therapeutic Activity:  [x] See flow sheet   Rationale: increase ROM, increase strength, and improve coordination to assist in improving patient's ability to perform functional activities and ADLs    15 min Neuromuscular Re-Education:  [x] See flow sheet   Rationale: improve coordination, improve balance/proprioception, improve posture, and improve core stabilization to assist in improving patient's ability to perform functional activities and ADLs as well as to improve core stabilization during static/dynamic movements      With   [x] TE   [] TA   [] neuro   [] other: Patient Education: [x] Review HEP    [] Progressed/Changed HEP based on:   [] positioning   [] body mechanics   [] transfers   [] heat/ice application    [] other:      Other Objective/Functional Measures: N/A     Pain Level (0-10 scale) Post Treatment: 2    ASSESSMENT/Changes in Function:  Patient responded well to today's treatment without any adverse reactions. Patient with no difficulty performing BOSU step up and overs (forward and lateral), but she does report an increase in pain of her right knee when weight is completely through her right LE (though within patients tolerance). She appears to be making steady progress towards goals, continue per POC. Patient will continue to benefit from skilled PT services to further modify and progress therapeutic interventions, address functional mobility deficits, address ROM deficits, address strength deficits, analyze and address soft tissue restrictions, analyze and cue movement patterns, analyze and modify body mechanics/ergonomics, assess and modify postural abnormalities, and instruct in home and community integration to attain remaining goals. [x]  See Plan of Care  []  See Progress Note/Recertification  []  See Discharge Summary         Progress Towards Goals/Updated Goals:    Short Term Goals: to be accomplished within 2 weeks:     Patient will report compliance with prescribed HEP at least 1x/day in order to assist in progression towards goals and restore functional mobility. Eval: HEP to be issued and explained to patient within 1-2 visits  Current: Patient report daily compliance with her HEP.     3/14/22, met     Long Term Goals: to be accomplished within 6 weeks:     Patient will score at least 63 points on FOTO in order to maximize function and promote patient satisfaction with overall outcome.  (FOTO is an established functional score where 100 = no disability)  Eval: 55  Current: remains 55, but patient verbally describes functional improvement       3/30/22, in progress     Patient will demonstrate at least 5/5 strength bilaterally in order to be able to safely perform functional activities.   Eval: Grossly 5/5 bilaterally except bilateral right quad 4+/5, hip abduction 4-/5, left hip ext with knee bent 4-/5, right hip ext with knee bent 4/5  Current: grossly 5/5 bilaterally except right hip abduction 4-/5, left hip abduction 4/5    4/1/22, in progress     Patient will less than or equal to 2/10 pain during all functional activities and ADLs in order to improve QOL and return to patient's PLOF. Eval: pain ranges from 3-8, currently 5/10; limited in standing/walking > 20 mins, difficulty with sit <> stands and stair descents  Current: pain ranges from 3-7/10, able to stand/walk up to 30 minutes, improving ability with sit <> stands and stair descents     4/1/22, in progress     Patient will be able to negotiate a full flight of stairs with a step-through pattern and less than or equal to 2/10 pain in order to improve safety and return patient to her PLOF.             Eval: ascents step-through, descents step-to; up to 5/10 pain during descents              Current: can now negotiate a full flight of stairs with a step-through pattern, but with 3/10 pain level     4/7/22, in progress     Patient will be able to perform at least 15 reps of sit <> stands during 30\" STS test to demonstrate improved LE strength and stability during this functional activity, thus reducing the patient's risk of falls.              Eval: 11 reps, without use of arms              Current: 12 reps, without use of arm     4/1/22, in progress    PLAN  []  Upgrade Activities as Tolerated     [x]  Continue Plan of Care  []  Update Interventions per Flow Sheet       []  Discharge Due To:_  []  Other:_      Agustina Martins.  Juani, PT, DPT, ATR  4/7/2022 9:41 AM    Future Appointments   Date Time Provider Toña Cheema   4/7/2022  3:15 PM EZEQUIEL Vivas [facility]   4/12/2022  3:15 PM EZEQUIEL De Souza THE [facility]   4/15/2022 12:30 PM THE [facility] EZEQUIEL SANDRA [facility]   4/19/2022 11:00 AM EZEQUIEL De Souza [facility]   4/22/2022  2:30 PM Eusebio Van THE [facility]   4/26/2022  4:00 PM Trip Horn PT [facility]   4/29/2022  3:15 PM Tracey King THE [facility]

## 2022-04-12 ENCOUNTER — HOSPITAL ENCOUNTER (OUTPATIENT)
Dept: PHYSICAL THERAPY | Age: 57
Discharge: HOME | End: 2022-04-12
Payer: OTHER GOVERNMENT

## 2022-04-12 PROCEDURE — 97112 NEUROMUSCULAR REEDUCATION: CPT

## 2022-04-12 PROCEDURE — 97110 THERAPEUTIC EXERCISES: CPT

## 2022-04-12 PROCEDURE — 97530 THERAPEUTIC ACTIVITIES: CPT

## 2022-04-12 NOTE — PROGRESS NOTES
PT DAILY TREATMENT NOTE - [facility]     Patient Name: Rehan Michaels  MCKP:  : 1965  [x]  Patient  Verified  Payor: LORENA / Plan: Jerome Lugo 73 / Product Type:  /    In time:1520  Out time:1620  Total Treatment Time (min): 60  Total Timed Codes (min): 60   Visit #: 10 of 15    Treatment Area: Pain in right knee [M25.561]    SUBJECTIVE  Pain Level (0-10 scale): 4  Any medication changes, allergies to medications, adverse drug reactions, diagnosis change, or new procedure performed?: [x] No    [] Yes (see summary sheet for update)  Subjective functional status/changes:   [] No changes reported    Patient reports increased right anterior knee pain today. OBJECTIVE    30 min Therapeutic Exercise:  [x] See flow sheet :   Rationale: increase ROM, increase strength and decrease pain to improve the patients ability to complete ADLs    20 min Therapeutic Activity:  [x]  See flow sheet :   Rationale: increase ROM, increase strength and improve coordination  to improve the patients ability to complete ADLs     10 min Neuromuscular Re-education:  [x]  See flow sheet :   Rationale: improve coordination, improve balance and increase proprioception  to improve the patients ability to complete ADLs        With   [x] TE   [] TA   [] neuro   [] other: Patient Education: [x] Review HEP    [] Progressed/Changed HEP based on:   [] positioning   [] body mechanics   [] transfers   [] heat/ice application    [] other:      Other Objective/Functional Measures: NA     Pain Level (0-10 scale) post treatment: 2    ASSESSMENT/Changes in Function: Patient responds well to treatment session. Patient continues to demonstrate right gluteal and right gastroc weakness resulting in increased stress of anterior knee. Focused on posterior chain strengthening and patient reported decrease in symptoms. Will continue to focus on posterior chain strength in future visits.  No adverse effects were noted from today's treatment session    Patient will continue to benefit from skilled PT services to modify and progress therapeutic interventions, address functional mobility deficits, address ROM deficits, address strength deficits, analyze and address soft tissue restrictions, analyze and cue movement patterns, analyze and modify body mechanics/ergonomics, assess and modify postural abnormalities, address imbalance and instruct in home and community integration to attain remaining goals. []  See Plan of Care  []  See progress note/recertification  []  See Discharge Summary         Progress towards goals / Updated goals:  Short Term Goals: to be accomplished within 2 weeks:     Patient will report compliance with prescribed HEP at least 1x/day in order to assist in progression towards goals and restore functional mobility. Eval: HEP to be issued and explained to patient within 1-2 visits  Current: Patient report daily compliance with her HEP.     3/14/22, met     Long Term Goals: to be accomplished within 6 weeks:     Patient will score at least 63 points on FOTO in order to maximize function and promote patient satisfaction with overall outcome.  (FOTO is an established functional score where 100 = no disability)  Eval: 55  Current: remains 55, but patient verbally describes functional improvement       3/30/22, in progress     Patient will demonstrate at least 5/5 strength bilaterally in order to be able to safely perform functional activities. Eval: Grossly 5/5 bilaterally except bilateral right quad 4+/5, hip abduction 4-/5, left hip ext with knee bent 4-/5, right hip ext with knee bent 4/5  Current: grossly 5/5 bilaterally except right hip abduction 4-/5, left hip abduction 4/5    4/1/22, in progress     Patient will less than or equal to 2/10 pain during all functional activities and ADLs in order to improve QOL and return to patient's PLOF.   Eval: pain ranges from 3-8, currently 5/10; limited in standing/walking > 20 mins, difficulty with sit <> stands and stair descents  Current: pain ranges from 3-7/10, able to stand/walk up to 30 minutes, improving ability with sit <> stands and stair descents     4/1/22, in progress     Patient will be able to negotiate a full flight of stairs with a step-through pattern and less than or equal to 2/10 pain in order to improve safety and return patient to her PLOF.               Eval: ascents step-through, descents step-to; up to 5/10 pain during descents              Current: can now negotiate a full flight of stairs with a step-through pattern, but with 2-3/10 pain level     4/12/22, in progress     Patient will be able to perform at least 15 reps of sit <> stands during 30\" STS test to demonstrate improved LE strength and stability during this functional activity, thus reducing the patient's risk of falls.              Eval: 11 reps, without use of arms              YNWKMYL: 12 reps, without use of arm     4/1/22, in progress    PLAN  []  Upgrade activities as tolerated     [x]  Continue plan of care  []  Update interventions per flow sheet       []  Discharge due to:_  []  Other:_      Vik Mcgee, PT, DPT 4/12/2022  2:46 PM    Future Appointments   Date Time Provider Toña Cheema   4/12/2022  3:15 PM EZEQUIEL Livingston [facility]   4/15/2022 12:30 PM [facility] EZEQUIEL SANDRA [facility]   4/19/2022 11:00 AM EZEQUIEL Livingston [facility]   4/22/2022  2:30 PM Eusebio Kelley THE [facility]   4/26/2022  4:00 PM EZEQUIEL Livingston [facility]   4/29/2022  3:15 PM Eda King PT [facility]

## 2022-04-13 ENCOUNTER — TELEPHONE (OUTPATIENT)
Dept: PHYSICAL THERAPY | Age: 57
End: 2022-04-13

## 2022-04-15 ENCOUNTER — HOSPITAL ENCOUNTER (OUTPATIENT)
Dept: PHYSICAL THERAPY | Age: 57
Discharge: HOME | End: 2022-04-15
Payer: OTHER GOVERNMENT

## 2022-04-15 PROCEDURE — 97530 THERAPEUTIC ACTIVITIES: CPT

## 2022-04-15 PROCEDURE — 97110 THERAPEUTIC EXERCISES: CPT

## 2022-04-15 PROCEDURE — 97112 NEUROMUSCULAR REEDUCATION: CPT

## 2022-04-15 NOTE — PROGRESS NOTES
PT DAILY TREATMENT NOTE    Patient Name: Mikael EDGAR:[id number]  : 1965  [x]  Patient  Verified  Payor: LORENA / Plan: Jerome Lugo 74 / Product Type: [address] [place] [address] /    In QRDS:[id number]  Out time:119  Total Treatment Time (min): 44  Total Timed Codes (min): 44  1:1 Treatment Time (MC/BCBS only): 44   Visit #: 11 of 15    Treatment Dx: Pain in right knee [M25.561]    SUBJECTIVE  Pain Level (0-10 scale): 10  Any medication changes, allergies to medications, adverse drug reactions, diagnosis change, or new procedure performed?: [x] No    [] Yes (see summary sheet for update)  Subjective functional status/changes:   [] No changes reported  \"working in the front long cause pain with my knee today\"    OBJECTIVE           20 min Therapeutic Exercise:  [x] See flow sheet :   Rationale: increase ROM, increase strength and improve coordination to improve the patients ability to perform functional activities    10 min Therapeutic Activity:  [x]  See flow sheet :   Rationale: increase ROM, increase strength, improve coordination and improve balance  to improve the patients ability to perform ADL's     14 min Neuromuscular Re-education:  [x]  See flow sheet :   Rationale: increase ROM, increase strength, improve coordination, improve balance and increase proprioception  to improve the patients ability to perform ADL       With   [x] TE   [] TA   [] neuro   [] other: Patient Education: [x] Review HEP    [] Progressed/Changed HEP based on:   [] positioning   [x] body mechanics   [] transfers   [x] heat/ice application    [] other:      Other Objective/Functional Measures: NA     Pain Level (0-10 scale) post treatment: 3/10    ASSESSMENT/Changes in Function:   Patient tolerated treatment session well today. Pt demonstrate right LE weakness when performing exercise and required rest breaks in between sets to reduces pain. Patient able to accomplished exercise program at best effort with no complaints. Patient continues to make steady progress toward goals and would benefit from continued skilled PT intervention to address remaining deficits outlined in goals below. Patient will continue to benefit from skilled PT services to modify and progress therapeutic interventions, address functional mobility deficits, address ROM deficits, address strength deficits, analyze and address soft tissue restrictions, analyze and cue movement patterns, analyze and modify body mechanics/ergonomics and assess and modify postural abnormalities to attain remaining goals. [x]  See Plan of Care  []  See progress note/recertification  []  See Discharge Summary         Progress towards goals / Updated goals:  Short Term Goals: to be accomplished within 2 weeks:     Patient will report compliance with prescribed HEP at least 1x/day in order to assist in progression towards goals and restore functional mobility. Eval: HEP to be issued and explained to patient within 1-2 visits  Current: Patient report daily compliance with her HEP.     3/14/22, met     Long Term Goals: to be accomplished within 6 weeks:     Patient will score at least 63 points on FOTO in order to maximize function and promote patient satisfaction with overall outcome.  (FOTO is an established functional score where 100 = no disability)  Eval: 55  Current: remains 55, but patient verbally describes functional improvement       3/30/22, in progress     Patient will demonstrate at least 5/5 strength bilaterally in order to be able to safely perform functional activities.   Eval: Grossly 5/5 bilaterally except bilateral right quad 4+/5, hip abduction 4-/5, left hip ext with knee bent 4-/5, right hip ext with knee bent 4/5  Current: grossly 5/5 bilaterally except right hip abduction 4-/5, left hip abduction 4/5    4/1/22, in progress     Patient will less than or equal to 2/10 pain during all functional activities and ADLs in order to improve QOL and return to patient's PLOF. Eval: pain ranges from 3-8, currently 5/10; limited in standing/walking > 20 mins, difficulty with sit <> stands and stair descents   Current: 8/10 pain when stand/walk longer then 30 minutes, more pain with ADLs. 4/15/22 In progress    Patient will be able to negotiate a full flight of stairs with a step-through pattern and less than or equal to 2/10 pain in order to improve safety and return patient to her PLOF.               Eval: ascents step-through, descents step-to; up to 5/10 pain during descents              Current: can now negotiate a full flight of stairs with a step-through pattern, but with 2-3/10 pain level     4/12/22, in progress     Patient will be able to perform at least 15 reps of sit <> stands during 30\" STS test to demonstrate improved LE strength and stability during this functional activity, thus reducing the patient's risk of falls.              Eval: 11 reps, without use of arms              CEEAMKD: 12 reps, without use of arm     4/1/22, in progress       PLAN  []  Upgrade activities as tolerated     [x]  Continue plan of care  []  Update interventions per flow sheet       []  Discharge due to:_  []  Other:_      Juana Cruz, ROULA 4/15/2022  9:33 AM    Future Appointments   Date Time Provider Toña Cheema   4/15/2022 12:30 PM [facility] EZEQUIEL SANDRA [facility]   4/19/2022 11:00 AM EZEQUIEL Osman [facility]   4/22/2022  2:30 PM Eusebio Gaspar [facility]   4/26/2022  4:00 PM EZEQUIEL Osman [facility]   4/29/2022  3:15 PM Joe King, EZEQUIEL SANDRA [facility]

## 2022-04-19 ENCOUNTER — HOSPITAL ENCOUNTER (OUTPATIENT)
Dept: PHYSICAL THERAPY | Age: 57
Discharge: HOME | End: 2022-04-19
Payer: OTHER GOVERNMENT

## 2022-04-19 PROCEDURE — 97112 NEUROMUSCULAR REEDUCATION: CPT

## 2022-04-19 PROCEDURE — 97110 THERAPEUTIC EXERCISES: CPT

## 2022-04-19 PROCEDURE — 97530 THERAPEUTIC ACTIVITIES: CPT

## 2022-04-19 NOTE — PROGRESS NOTES
PT DAILY TREATMENT NOTE - [facility]     Patient Name: Gurpreet Harrison  [id number]  : 1965  [x]  Patient  Verified  Payor:  / Plan: [facility] / Product Type: Chasity Jean Carlos /    In time:1100  Out time:1200  Total Treatment Time (min): 60  Total Timed Codes (min): 60   Visit #: 12 of 15    Treatment Area: Pain in right knee [M25.561]    SUBJECTIVE  Pain Level (0-10 scale): 3  Any medication changes, allergies to medications, adverse drug reactions, diagnosis change, or new procedure performed?: [x] No    [] Yes (see summary sheet for update)  Subjective functional status/changes:   [] No changes reported  Patient reports that she is doing well but had increased knee pain after mowing her lawn. OBJECTIVE    30 min Therapeutic Exercise:  [x] See flow sheet :   Rationale: increase ROM, increase strength and decrease pain to improve the patients ability to complete ADLs    20 min Therapeutic Activity:  [x]  See flow sheet :   Rationale: increase ROM, increase strength and improve coordination  to improve the patients ability to complete ADLs     10 min Neuromuscular Re-education:  [x]  See flow sheet :   Rationale: improve coordination, improve balance and increase proprioception  to improve the patients ability to complete ADLs          With   [x] TE   [] TA   [] neuro   [] other: Patient Education: [x] Review HEP    [] Progressed/Changed HEP based on:   [] positioning   [] body mechanics   [] transfers   [] heat/ice application    [] other:      Other Objective/Functional Measures: NA     Pain Level (0-10 scale) post treatment: 0    ASSESSMENT/Changes in Function: Patient responds well to treatment session. Patient is challenged with single leg posterior chain exercise on the right. Added resistance band and had patient perform hip abduction with bilateral leg press reducing medial pressure on her right knee.  Will introduce gym exercise next visit as patient is transitioning toward independent exercise. No adverse effects were noted from today's treatment session      Patient will continue to benefit from skilled PT services to modify and progress therapeutic interventions, address functional mobility deficits, address ROM deficits, address strength deficits, analyze and address soft tissue restrictions, analyze and cue movement patterns, analyze and modify body mechanics/ergonomics, assess and modify postural abnormalities, address imbalance and instruct in home and community integration to attain remaining goals. []  See Plan of Care  []  See progress note/recertification  []  See Discharge Summary         Progress towards goals / Updated goals:  Short Term Goals: to be accomplished within 2 weeks:     Patient will report compliance with prescribed HEP at least 1x/day in order to assist in progression towards goals and restore functional mobility. Eval: HEP to be issued and explained to patient within 1-2 visits  Current: Patient report daily compliance with her HEP.     3/14/22, met     Long Term Goals: to be accomplished within 6 weeks:     Patient will score at least 63 points on FOTO in order to maximize function and promote patient satisfaction with overall outcome.  (FOTO is an established functional score where 100 = no disability)  Eval: 55  Current: remains 55, but patient verbally describes functional improvement       3/30/22, in progress     Patient will demonstrate at least 5/5 strength bilaterally in order to be able to safely perform functional activities. Eval: Grossly 5/5 bilaterally except bilateral right quad 4+/5, hip abduction 4-/5, left hip ext with knee bent 4-/5, right hip ext with knee bent 4/5  Current: grossly 5/5 bilaterally except right hip abduction 4-/5, left hip abduction 4/5    4/1/22, in progress     Patient will less than or equal to 2/10 pain during all functional activities and ADLs in order to improve QOL and return to patient's PLOF.   Eval: pain ranges from 3-8, currently 5/10; limited in standing/walking > 20 mins, difficulty with sit <> stands and stair descents   Current: 8/10 pain when stand/walk longer then 30 minutes, more pain with ADLs. 4/15/22 In progress     Patient will be able to negotiate a full flight of stairs with a step-through pattern and less than or equal to 2/10 pain in order to improve safety and return patient to her PLOF.               Eval: ascents step-through, descents step-to; up to 5/10 pain during descents              Current: can now negotiate a full flight of stairs with a step-through pattern, but with 2-3/10 pain level     4/12/22, in progress     Patient will be able to perform at least 15 reps of sit <> stands during 30\" STS test to demonstrate improved LE strength and stability during this functional activity, thus reducing the patient's risk of falls.              Eval: 11 reps, without use of arms              HKCYRIA: 12 reps, without use of arm     4/1/22, in progress    PLAN  []  Upgrade activities as tolerated     [x]  Continue plan of care  []  Update interventions per flow sheet       []  Discharge due to:_  []  Other:_      Harsha Rolle, PT, DPT 4/19/2022  11:42 AM    Future Appointments   Date Time Provider Toña Cheema   4/22/2022  2:30 PM Eusebio Weems [facility]   4/26/2022  4:00 PM Brigida Faust, PT BOAZ [facility]   4/29/2022  3:15 PM Halima King, EZEQUIEL SANDRA [facility]

## 2022-04-21 ENCOUNTER — HOSPITAL ENCOUNTER (OUTPATIENT)
Dept: PHYSICAL THERAPY | Age: 57
Discharge: HOME | End: 2022-04-21
Payer: OTHER GOVERNMENT

## 2022-04-21 PROCEDURE — 97530 THERAPEUTIC ACTIVITIES: CPT

## 2022-04-21 PROCEDURE — 97112 NEUROMUSCULAR REEDUCATION: CPT

## 2022-04-21 PROCEDURE — 97110 THERAPEUTIC EXERCISES: CPT

## 2022-04-21 NOTE — PROGRESS NOTES
PT DAILY TREATMENT NOTE - [facility]     Patient Name: Crystal Jorgensen  Date:2022  : 1965  [x]  Patient  Verified  Payor: LORENA / Plan: Jerome Lugo 73 / Product Type:  /    In time:1023  Out time:1116  Total Treatment Time (min): 53  Total Timed Codes (min): 53   1:1 Treatment Time ( only): 48   Visit #: 13 of 15           Treatment Area: Pain in right knee [M25.561]    SUBJECTIVE  Pain Level (0-10 scale): 3  Any medication changes, allergies to medications, adverse drug reactions, diagnosis change, or new procedure performed?: [x] No    [] Yes (see summary sheet for update)  Subjective functional status/changes:   [] No changes reported  Patient reports that she had bilateral LE pain after last visit. OBJECTIVE    30 min Therapeutic Exercise:  [x] See flow sheet :   Rationale: increase ROM, increase strength and decrease pain to improve the patients ability to complete ADLs    13 min Therapeutic Activity:  [x]  See flow sheet :   Rationale: increase ROM, increase strength and improve coordination  to improve the patients ability to complete ADLs     10 min Neuromuscular Re-education:  [x]  See flow sheet :   Rationale: improve coordination, improve balance and increase proprioception  to improve the patients ability to complete ADLs          With   [x] TE   [] TA   [] neuro   [] other: Patient Education: [x] Review HEP    [] Progressed/Changed HEP based on:   [] positioning   [] body mechanics   [] transfers   [] heat/ice application    [] other:      Other Objective/Functional Measures: NA      Pain Level (0-10 scale) post treatment: 2    ASSESSMENT/Changes in Function: Patient responds well to treatment session. Advanced exercise by introducing machine exercise in gym as patient is transitioning toward independent exercise. Will reduce frequency as to promote independence with exercise.  No adverse effects were noted from today's treatment session      Patient will continue to benefit from skilled PT services to modify and progress therapeutic interventions, address functional mobility deficits, address ROM deficits, address strength deficits, analyze and address soft tissue restrictions, analyze and cue movement patterns, analyze and modify body mechanics/ergonomics, assess and modify postural abnormalities, address imbalance and instruct in home and community integration to attain remaining goals. []  See Plan of Care  []  See progress note/recertification  []  See Discharge Summary         Progress towards goals / Updated goals:  Short Term Goals: to be accomplished within 2 weeks:     Patient will report compliance with prescribed HEP at least 1x/day in order to assist in progression towards goals and restore functional mobility. Eval: HEP to be issued and explained to patient within 1-2 visits  Current: Patient report daily compliance with her HEP.     3/14/22, met     Long Term Goals: to be accomplished within 6 weeks:     Patient will score at least 63 points on FOTO in order to maximize function and promote patient satisfaction with overall outcome.  (FOTO is an established functional score where 100 = no disability)  Eval: 55  Current: remains 55, but patient verbally describes functional improvement       3/30/22, in progress     Patient will demonstrate at least 5/5 strength bilaterally in order to be able to safely perform functional activities. Eval: Grossly 5/5 bilaterally except bilateral right quad 4+/5, hip abduction 4-/5, left hip ext with knee bent 4-/5, right hip ext with knee bent 4/5  Current: grossly 5/5 bilaterally except right hip abduction 4-/5, left hip abduction 4/5    4/1/22, in progress     Patient will less than or equal to 2/10 pain during all functional activities and ADLs in order to improve QOL and return to patient's PLOF.   Eval: pain ranges from 3-8, currently 5/10; limited in standing/walking > 20 mins, difficulty with sit <> stands and stair descents   Current: 8/10 pain when stand/walk longer then 30 minutes, more pain with ADLs. 4/15/22 In progress     Patient will be able to negotiate a full flight of stairs with a step-through pattern and less than or equal to 2/10 pain in order to improve safety and return patient to her PLOF.               Eval: ascents step-through, descents step-to; up to 5/10 pain during descents              Current: can now negotiate a full flight of stairs with a step-through pattern, but with 2-3/10 pain level     4/21/22, in progress     Patient will be able to perform at least 15 reps of sit <> stands during 30\" STS test to demonstrate improved LE strength and stability during this functional activity, thus reducing the patient's risk of falls.              Eval: 11 reps, without use of arms               WXXZIIN: 12 reps, without use of arm     4/1/22, in progress       PLAN  []  Upgrade activities as tolerated     [x]  Continue plan of care  []  Update interventions per flow sheet       []  Discharge due to:_  []  Other:_      Bjorn Gorman, PT, DPT 4/21/2022  11:22 AM    Future Appointments   Date Time Provider Toña Cheema   4/26/2022  4:00 PM Raisa Larkin, PT BOAZ [facility]   4/29/2022  3:15 PM Rosemary King, PT BOAZ [facility]

## 2022-04-22 ENCOUNTER — APPOINTMENT (OUTPATIENT)
Dept: PHYSICAL THERAPY | Age: 57
End: 2022-04-22
Payer: OTHER GOVERNMENT

## 2022-04-26 ENCOUNTER — HOSPITAL ENCOUNTER (OUTPATIENT)
Dept: PHYSICAL THERAPY | Age: 57
Discharge: HOME | End: 2022-04-26
Payer: OTHER GOVERNMENT

## 2022-04-26 PROCEDURE — 97110 THERAPEUTIC EXERCISES: CPT

## 2022-04-26 PROCEDURE — 97530 THERAPEUTIC ACTIVITIES: CPT

## 2022-04-26 PROCEDURE — 97112 NEUROMUSCULAR REEDUCATION: CPT

## 2022-04-26 NOTE — PROGRESS NOTES
PT DAILY TREATMENT NOTE - [facility]     Patient Name: Tyrone Best  Date:2022  : 1965  [x]  Patient  Verified  Payor: LORENA / Plan: Jerome Lugo 73 / Product Type: Kayleen Choudhury /    In time:1600  Out time:1700  Total Treatment Time (min): 60  Total Timed Codes (min): 60   Visit #: 14 of 15    Treatment Area: Pain in right knee [M25.561]    SUBJECTIVE  Pain Level (0-10 scale): 3-4  Any medication changes, allergies to medications, adverse drug reactions, diagnosis change, or new procedure performed?: [x] No    [] Yes (see summary sheet for update)  Subjective functional status/changes:   [] No changes reported    Patient reports increased knee pain today. She reports reduced standing tolerance. OBJECTIVE    30 min Therapeutic Exercise:  [x] See flow sheet :   Rationale: increase ROM, increase strength and decrease pain to improve the patients ability to complete ADLs    20 min Therapeutic Activity:  [x]  See flow sheet :   Rationale: increase ROM, increase strength and improve coordination  to improve the patients ability to complete ADLs     10 min Neuromuscular Re-education:  [x]  See flow sheet :   Rationale: improve coordination, improve balance and increase proprioception  to improve the patients ability to complete ADLs        With   [x] TE   [] TA   [] neuro   [] other: Patient Education: [x] Review HEP    [] Progressed/Changed HEP based on:   [] positioning   [] body mechanics   [] transfers   [] heat/ice application    [] other:      Other Objective/Functional Measures: NA     Pain Level (0-10 scale) post treatment: 3    ASSESSMENT/Changes in Function: Patient responds well to treatment session. Patient is improving but is still limited by knee pain with prolonged weight bearing. She had a reduction in knee pain during sit to stands when hip abduction was added to the exercise.  Will reassess patient next visit to determine if she is a candidate for aquatic therapy to learn a program as she is transitioning toward independent exercise. No adverse effects were noted from today's treatment session    Patient will continue to benefit from skilled PT services to modify and progress therapeutic interventions, address functional mobility deficits, address ROM deficits, address strength deficits, analyze and address soft tissue restrictions, analyze and cue movement patterns, analyze and modify body mechanics/ergonomics, assess and modify postural abnormalities, address imbalance and instruct in home and community integration to attain remaining goals. []  See Plan of Care  []  See progress note/recertification  []  See Discharge Summary         Progress towards goals / Updated goals:  Short Term Goals: to be accomplished within 2 weeks:     Patient will report compliance with prescribed HEP at least 1x/day in order to assist in progression towards goals and restore functional mobility. Eval: HEP to be issued and explained to patient within 1-2 visits  Current: Patient report daily compliance with her HEP.     3/14/22, met     Long Term Goals: to be accomplished within 6 weeks:     Patient will score at least 63 points on FOTO in order to maximize function and promote patient satisfaction with overall outcome.  (FOTO is an established functional score where 100 = no disability)  Eval: 55  Current: remains 55, but patient verbally describes functional improvement       3/30/22, in progress     Patient will demonstrate at least 5/5 strength bilaterally in order to be able to safely perform functional activities.   Eval: Grossly 5/5 bilaterally except bilateral right quad 4+/5, hip abduction 4-/5, left hip ext with knee bent 4-/5, right hip ext with knee bent 4/5  Current: grossly 5/5 bilaterally except right hip abduction 4/5, left hip abduction 4+/5    4/26/22, in progress     Patient will less than or equal to 2/10 pain during all functional activities and ADLs in order to improve QOL and return to patient's PLOF. Eval: pain ranges from 3-8, currently 5/10; limited in standing/walking > 20 mins, difficulty with sit <> stands and stair descents   Current: 4-5/10 pain when stand/walk longer then 30 minutes, more pain with ADLs. 4/26/22 In progress     Patient will be able to negotiate a full flight of stairs with a step-through pattern and less than or equal to 2/10 pain in order to improve safety and return patient to her PLOF.               Eval: ascents step-through, descents step-to; up to 5/10 pain during descents              Current: can now negotiate a full flight of stairs with a step-through pattern, but with 2-4/10 pain level     4/26/22, in progress     Patient will be able to perform at least 15 reps of sit <> stands during 30\" STS test to demonstrate improved LE strength and stability during this functional activity, thus reducing the patient's risk of falls.              Eval: 11 reps, without use of arms               QINUNZK: 12 reps, without use of arm     4/1/22, in progress     PLAN  []  Upgrade activities as tolerated     [x]  Continue plan of care  []  Update interventions per flow sheet       []  Discharge due to:_  []  Other:_      Aneudy Alcala PT, DPT 4/26/2022  4:58 PM    Future Appointments   Date Time Provider Toña Cheema   4/29/2022  3:15 PM Blessing King, PT [address] 61. [facility]

## 2022-04-29 ENCOUNTER — HOSPITAL ENCOUNTER (OUTPATIENT)
Dept: PHYSICAL THERAPY | Age: 57
Discharge: HOME | End: 2022-04-29
Payer: OTHER GOVERNMENT

## 2022-04-29 PROCEDURE — 97530 THERAPEUTIC ACTIVITIES: CPT

## 2022-04-29 PROCEDURE — 97110 THERAPEUTIC EXERCISES: CPT

## 2022-04-29 PROCEDURE — 97112 NEUROMUSCULAR REEDUCATION: CPT

## 2022-04-29 NOTE — PROGRESS NOTES
[facility] at [address] [place] [address] Drive: [phone]   Fax: [phone]  Progress Note  Patient name: Margaret Ellis Start of Care: 3/11/2022   Referral source: Hailey Laurent MD : [id number]               Medical Diagnosis: Pain in right knee [M25.561]    Onset Date: chronic x 10 years               Treatment Diagnosis: right knee patellofemoral syndrome/DJD   Prior Hospitalization: see medical history Provider#: [id number]   Medications: Verified on Patient summary List   ===========================================================================================  Assessment / Summary of Care:  Constantino Choudhury is a 61 y.o.  female who has progressed to independence in HEP. Patient has membership at [employer]. PT has been focusing last few sessions on training patient in proper use of available gym equipment. Patient also has access to a pool with her membership. Due to persistence of patellofemoral pain with ADLs and land based exercises, patient would benefit form instruction in appropriate exercises to perform in aquatic environment. Will progress to independence in both gym and pool exercises and then transition to independent self care. Patient will continue to benefit from skilled PT services to modify and progress therapeutic interventions, address functional mobility deficits, address ROM deficits, address strength deficits, analyze and address soft tissue restrictions, analyze and cue movement patterns, analyze and modify body mechanics/ergonomics, assess and modify postural abnormalities,  and instruct in home and community integration to attain remaining goals.    ===========================================================================================    Plan:Continue therapy with two sessions of Aquatic Exercise instruction and one session of land therapy then discharge to independent self care.     Progress Towards Goals:   Short Term Goals: to be accomplished within 2 weeks:     Patient will report compliance with prescribed HEP at least 1x/day in order to assist in progression towards goals and restore functional mobility. Eval: HEP to be issued and explained to patient within 1-2 visits  Current: Patient report daily compliance with her HEP.     3/14/22, met     Long Term Goals: to be accomplished within 6 weeks:     Patient will score at least 63 points on FOTO in order to maximize function and promote patient satisfaction with overall outcome.  (FOTO is an established functional score where 100 = no disability)  Eval: 55  Current: 47, patient reports feels stronger but still painful      4/29/22, regressed     Patient will demonstrate at least 5/5 strength bilaterally in order to be able to safely perform functional activities. Eval: Grossly 5/5 bilaterally except bilateral right quad 4+/5, hip abduction 4-/5, left hip ext with knee bent 4-/5, right hip ext with knee bent 4/5  Current: grossly 5/5 bilaterally except right hip abduction 4/5, left hip abduction 4+/5    4/26/22, in progress     Patient will less than or equal to 2/10 pain during all functional activities and ADLs in order to improve QOL and return to patient's PLOF. Eval: pain ranges from 3-8, currently 5/10; limited in standing/walking > 20 mins, difficulty with sit <> stands and stair descents   Current: 4-5/10 pain when stand/walk longer then 30 minutes, more pain with ADLs. 4/26/22 In progress     Patient will be able to negotiate a full flight of stairs with a step-through pattern and less than or equal to 2/10 pain in order to improve safety and return patient to her PLOF.               Eval: ascents step-through, descents step-to; up to 5/10 pain during descents              Current: can now negotiate a full flight of stairs with a step-through pattern, but with 2-4/10 pain level     4/26/22, in progress     Patient will be able to perform at least 15 reps of sit <> stands during 30\" STS test to demonstrate improved LE strength and stability during this functional activity, thus reducing the patient's risk of falls.              Eval: 11 reps, without use of arms               Current: 13 reps, without use of arm     4/29/22, in progress    ===========================================================================================  Subjective: \"The knee still gets pretty painful off and on. It was pretty aggravated driving today from home to PT. \"        Therapist Signature: Eagle Morales PT DPT Date: 2/35/1672   Re-Certification: NA Time: 4:11 PM         [facility] at [address]                    Phone: [phone]   Fax: [phone]  .

## 2022-04-29 NOTE — PROGRESS NOTES
PT DAILY TREATMENT NOTE    Patient Name: Beth Holland  Date:2022  : 1965  [x]  Patient  Verified  Payor: [employer]ORE[employer] / Plan: Jerome Lugo 74 / Product Type:  /    In time: 315  Out time: 403  Total Treatment Time (min): 48  Total Timed Codes (min): 48   Visit #: 15 of 18    Treatment Dx: Pain in right knee [M25.561]    SUBJECTIVE  Pain Level (0-10 scale): 3-4  Any medication changes, allergies to medications, adverse drug reactions, diagnosis change, or new procedure performed?: [x] No    [] Yes (see summary sheet for update)  Subjective functional status/changes:   [] No changes reported  \"The knee still gets pretty painful off and on. It was pretty aggravated driving today from home to PT. \"    OBJECTIVE    20 min Therapeutic Exercise:  [x] See flow sheet :   Rationale: increase ROM, increase strength and decrease pain to improve the patients ability to complete ADLs  ambulation safety and efficiency in order to improve patient's ability to safely ambulate at home for self care. 15 min Therapeutic Activity:  [x]  See flow sheet :   Rationale: increase ROM, increase strength and improve coordination  to improve the patients ability to Complete ADLS     13 min Neuromuscular Re-education:  [x]  See flow sheet :   Rationale: improve coordination, improve balance and increase proprioception  to improve the patients ability to complete ADLS, and decrease falls risk    With   [] TE   [] TA   [] neuro   [] other: Patient Education: [x] Review HEP    [] Progressed/Changed HEP based on:   [] positioning   [] body mechanics   [] transfers   [] heat/ice application    [] other:      Other Objective/Functional Measures: NA     Pain Level (0-10 scale) post treatment: 3    ASSESSMENT/Changes in Function: Patient has progressed to independence in HEP. Patient has membership at [employer]. PT has been focusing last few sessions on training patient in proper use of available gym equipment. Patient also has access to a pool with her membership. Due to persistence of patellofemoral pain with ADLs and land based exercises, patient would benefit form instruction in appropriate exercises to perform in aquatic environment. Will progress to independence in both gym and pool exercises and then transition to independent self care. Patient responds well to treatment session. No adverse effects were noted from today's treatment session. Patient will continue to benefit from skilled PT services to modify and progress therapeutic interventions, address functional mobility deficits, address ROM deficits, address strength deficits, analyze and address soft tissue restrictions, analyze and cue movement patterns, analyze and modify body mechanics/ergonomics, assess and modify postural abnormalities,  and instruct in home and community integration to attain remaining goals. []  See Plan of Care  [x]  See progress note/recertification  []  See Discharge Summary         Progress towards goals / Updated goals:  Short Term Goals: to be accomplished within 2 weeks:     Patient will report compliance with prescribed HEP at least 1x/day in order to assist in progression towards goals and restore functional mobility. Eval: HEP to be issued and explained to patient within 1-2 visits  Current: Patient report daily compliance with her HEP.     3/14/22, met     Long Term Goals: to be accomplished within 6 weeks:     Patient will score at least 63 points on FOTO in order to maximize function and promote patient satisfaction with overall outcome.  (FOTO is an established functional score where 100 = no disability)  Eval: 55  Current:47, patient reports feels stronger but still painful      4/29/22, regressed     Patient will demonstrate at least 5/5 strength bilaterally in order to be able to safely perform functional activities.   Eval: Grossly 5/5 bilaterally except bilateral right quad 4+/5, hip abduction 4-/5, left hip ext with knee bent 4-/5, right hip ext with knee bent 4/5  Current: grossly 5/5 bilaterally except right hip abduction 4/5, left hip abduction 4+/5    4/26/22, in progress     Patient will less than or equal to 2/10 pain during all functional activities and ADLs in order to improve QOL and return to patient's PLOF. Eval: pain ranges from 3-8, currently 5/10; limited in standing/walking > 20 mins, difficulty with sit <> stands and stair descents   Current: 4-5/10 pain when stand/walk longer then 30 minutes, more pain with ADLs. 4/26/22 In progress     Patient will be able to negotiate a full flight of stairs with a step-through pattern and less than or equal to 2/10 pain in order to improve safety and return patient to her PLOF.             Eval: ascents step-through, descents step-to; up to 5/10 pain during descents              Current: can now negotiate a full flight of stairs with a step-through pattern, but with 2-4/10 pain level     4/26/22, in progress     Patient will be able to perform at least 15 reps of sit <> stands during 30\" STS test to demonstrate improved LE strength and stability during this functional activity, thus reducing the patient's risk of falls.              Eval: 11 reps, without use of arms               [id number]: 13 reps, without use of arm     4/29/22, in progress    PLAN  []  Upgrade activities as tolerated     [x]  Continue plan of care  []  Update interventions per flow sheet       []  Discharge due to:_  []  Other:_      Zina Estes, PT 4/29/2022  3:40 PM    No future appointments.

## 2022-05-17 ENCOUNTER — HOSPITAL ENCOUNTER (OUTPATIENT)
Dept: PHYSICAL THERAPY | Age: 57
Discharge: HOME | End: 2022-05-17
Payer: OTHER GOVERNMENT

## 2022-05-17 PROCEDURE — 97110 THERAPEUTIC EXERCISES: CPT

## 2022-05-17 PROCEDURE — 97530 THERAPEUTIC ACTIVITIES: CPT

## 2022-05-17 PROCEDURE — 97112 NEUROMUSCULAR REEDUCATION: CPT

## 2022-05-17 NOTE — PROGRESS NOTES
PT DAILY TREATMENT NOTE    Patient Name: Rufus Akhtar  ZOZZ:  : 1965  [x]  Patient  Verified  Payor:  / Plan: Jerome Lugo 74 / Product Type:  /    In time: 315  Out time: 359  Total Treatment Time (min): 44  Total Timed Codes (min): 44   Visit #: 16 of 18  Treatment Dx: Pain in right knee [M25.561]    SUBJECTIVE  Pain Level (0-10 scale): 3-4  Any medication changes, allergies to medications, adverse drug reactions, diagnosis change, or new procedure performed?: [x] No    [] Yes (see summary sheet for update)  Subjective functional status/changes:   [] No changes reported  \"I have access to a pool, so I am looking forward to learning exercises I can do in the pool to help my knee. \"    OBJECTIVE    19 min Therapeutic Exercise:  [x] See flow sheet :   Rationale: increase ROM, increase strength and decrease pain to improve the patients ability to complete ADLs  ambulation safety and efficiency in order to improve patient's ability to safely ambulate at home for self care. 15 min Therapeutic Activity:  [x]  See flow sheet :   Rationale: increase ROM, increase strength and improve coordination  to improve the patients ability to Complete ADLS     10 min Neuromuscular Re-education:  [x]  See flow sheet :   Rationale: improve coordination, improve balance and increase proprioception  to improve the patients ability to complete ADLS, and decrease falls risk    With   [] TE   [] TA   [] neuro   [] other: Patient Education: [x] Review HEP    [] Progressed/Changed HEP based on:   [] positioning   [] body mechanics   [] transfers   [] heat/ice application    [] other:      Other Objective/Functional Measures: NA     Pain Level (0-10 scale) post treatment: 2-3    ASSESSMENT/Changes in Function: Patient introduced to Aquatic Exercise Program for Management of Knee Pain and provided a written copy of all exercises.  Instructed patient to review written aquatic program so any questions can be answered at second aquatic exercise instruction session. Patient responds well to treatment session. No adverse effects were noted from today's treatment session. Patient will continue to benefit from skilled PT services to modify and progress therapeutic interventions, address functional mobility deficits, address ROM deficits, address strength deficits, analyze and address soft tissue restrictions, analyze and cue movement patterns, analyze and modify body mechanics/ergonomics, assess and modify postural abnormalities,  and instruct in home and community integration to attain remaining goals. [x]  See Plan of Care  []  See progress note/recertification  []  See Discharge Summary         Progress towards goals / Updated goals:  Short Term Goals: to be accomplished within 2 weeks:     Patient will report compliance with prescribed HEP at least 1x/day in order to assist in progression towards goals and restore functional mobility. Eval: HEP to be issued and explained to patient within 1-2 visits  Current: Patient introduced to Aquatic Exercise Program for Management of Knee Pain and provided a written copy of all exercises.     5/17/22, in progress     Long Term Goals: to be accomplished within 6 weeks:     Patient will score at least 63 points on FOTO in order to maximize function and promote patient satisfaction with overall outcome.  (FOTO is an established functional score where 100 = no disability)  Eval: 55  Current: 47, patient reports feels stronger but still painful      4/29/22, regressed     Patient will demonstrate at least 5/5 strength bilaterally in order to be able to safely perform functional activities.   Eval: Grossly 5/5 bilaterally except bilateral right quad 4+/5, hip abduction 4-/5, left hip ext with knee bent 4-/5, right hip ext with knee bent 4/5  Current: grossly 5/5 bilaterally except right hip abduction 4/5, left hip abduction 4+/5    4/26/22, in progress     Patient will less than or equal to 2/10 pain during all functional activities and ADLs in order to improve QOL and return to patient's PLOF. Eval: pain ranges from 3-8, currently 5/10; limited in standing/walking > 20 mins, difficulty with sit <> stands and stair descents   Current: 4-5/10 pain when stand/walk longer then 30 minutes, more pain with ADLs. 4/26/22 In progress     Patient will be able to negotiate a full flight of stairs with a step-through pattern and less than or equal to 2/10 pain in order to improve safety and return patient to her PLOF.               Eval: ascents step-through, descents step-to; up to 5/10 pain during descents              Current: can now negotiate a full flight of stairs with a step-through pattern, but with 2-4/10 pain level     4/26/22, in progress     Patient will be able to perform at least 15 reps of sit <> stands during 30\" STS test to demonstrate improved LE strength and stability during this functional activity, thus reducing the patient's risk of falls.              Eval: 11 reps, without use of arms               [id number]: 13 reps, without use of arm     4/29/22, in progress    PLAN  []  Upgrade activities as tolerated     [x]  Continue plan of care  []  Update interventions per flow sheet       []  Discharge due to:_  []  Other:_      Eagle Morales PT 5/17/2022  3:24 PM    Future Appointments   Date Time Provider Toña Cheema   5/19/2022  2:45 PM Eusebio Harris [facility]   5/24/2022  2:30 PM Cornel Garcia PT [facility]

## 2022-05-19 ENCOUNTER — HOSPITAL ENCOUNTER (OUTPATIENT)
Dept: PHYSICAL THERAPY | Age: 57
Discharge: HOME | End: 2022-05-19
Payer: OTHER GOVERNMENT

## 2022-05-19 PROCEDURE — 97530 THERAPEUTIC ACTIVITIES: CPT

## 2022-05-19 PROCEDURE — 97112 NEUROMUSCULAR REEDUCATION: CPT

## 2022-05-19 PROCEDURE — 97110 THERAPEUTIC EXERCISES: CPT

## 2022-05-19 NOTE — PROGRESS NOTES
PT DAILY TREATMENT NOTE    Patient Name: Sherman Alaniz  [id number]  : 1965  [x]  Patient  Verified  Payor: LORENA / Plan: Jerome Lugo 74 / Product Type:  /    In time: 245  Out time: 332  Total Treatment Time (min): 47  Total Timed Codes (min): 47  1:1 Treatment Time ([facility] only): 45   Visit #: 17 of 18    Treatment Dx: Pain in right knee [M25.561]    SUBJECTIVE  Pain Level (0-10 scale): 3  Any medication changes, allergies to medications, adverse drug reactions, diagnosis change, or new procedure performed?: [x] No    [] Yes (see summary sheet for update)  Subjective functional status/changes:   [] No changes reported  \"Still some pain at the front of the knee. \"    OBJECTIVE    20 min Therapeutic Exercise:  [x] See flow sheet :   Rationale: increase ROM, increase strength and decrease pain to improve the patients ability to complete ADLs  ambulation safety and efficiency in order to improve patient's ability to safely ambulate at home for self care. 15 min Therapeutic Activity:  [x]  See flow sheet :   Rationale: increase ROM, increase strength and improve coordination  to improve the patients ability to Complete ADLS     10 min Neuromuscular Re-education:  [x]  See flow sheet :   Rationale: improve coordination, improve balance and increase proprioception  to improve the patients ability to complete ADLS, and decrease falls risk    With   [] TE   [] TA   [] neuro   [] other: Patient Education: [x] Review HEP    [] Progressed/Changed HEP based on:   [] positioning   [] body mechanics   [] transfers   [] heat/ice application    [] other:      Other Objective/Functional Measures: NA     Pain Level (0-10 scale) post treatment: 2    ASSESSMENT/Changes in Function: Patient provided additional education regarding how to pace and how to progress aquatic exercise program over time. . Patient responds well to treatment session.   No adverse effects were noted from today's treatment session. Patient will continue to benefit from skilled PT services to modify and progress therapeutic interventions, address functional mobility deficits, address ROM deficits, address strength deficits, analyze and address soft tissue restrictions, analyze and cue movement patterns, analyze and modify body mechanics/ergonomics, assess and modify postural abnormalities,  and instruct in home and community integration to attain remaining goals. [x]  See Plan of Care  []  See progress note/recertification  []  See Discharge Summary         Progress towards goals / Updated goals:  Short Term Goals: to be accomplished within 2 weeks:     Patient will report compliance with prescribed HEP at least 1x/day in order to assist in progression towards goals and restore functional mobility. Eval: HEP to be issued and explained to patient within 1-2 visits  Current: Patient introduced to Aquatic Exercise Program for Management of Knee Pain and provided a written copy of all exercises.     5/17/22, in progress     Long Term Goals: to be accomplished within 6 weeks:     Patient will score at least 63 points on FOTO in order to maximize function and promote patient satisfaction with overall outcome.  (FOTO is an established functional score where 100 = no disability)  Eval: 55  Current: 47, patient reports feels stronger but still painful      4/29/22, regressed     Patient will demonstrate at least 5/5 strength bilaterally in order to be able to safely perform functional activities. Eval: Grossly 5/5 bilaterally except bilateral right quad 4+/5, hip abduction 4-/5, left hip ext with knee bent 4-/5, right hip ext with knee bent 4/5  Current: grossly 5/5 bilaterally except right hip abduction 4/5, left hip abduction 4+/5    4/26/22, in progress     Patient will less than or equal to 2/10 pain during all functional activities and ADLs in order to improve QOL and return to patient's PLOF.   Eval: pain ranges from 3-8, currently 5/10; limited in standing/walking > 20 mins, difficulty with sit <> stands and stair descents   Current: 4-5/10 pain when stand/walk longer then 30 minutes, more pain with ADLs. 4/26/22 In progress     Patient will be able to negotiate a full flight of stairs with a step-through pattern and less than or equal to 2/10 pain in order to improve safety and return patient to her PLOF.               Eval: ascents step-through, descents step-to; up to 5/10 pain during descents              Current: can now negotiate a full flight of stairs with a step-through pattern, but with 2-4/10 pain level     4/26/22, in progress     Patient will be able to perform at least 15 reps of sit <> stands during 30\" STS test to demonstrate improved LE strength and stability during this functional activity, thus reducing the patient's risk of falls.              Eval: 11 reps, without use of arms               [id number]: 13 reps, without use of arm     4/29/22, in progress    PLAN  []  Upgrade activities as tolerated     [x]  Continue plan of care  []  Update interventions per flow sheet       []  Discharge due to:_  []  Other:_      Mounika Hdz PT 5/19/2022  2:53 PM    Future Appointments   Date Time Provider Toña Cheema   5/24/2022  2:30 PM Katie Gutiérrez, PT [facility]

## 2022-05-24 ENCOUNTER — HOSPITAL ENCOUNTER (OUTPATIENT)
Dept: PHYSICAL THERAPY | Age: 57
Discharge: HOME | End: 2022-05-24
Payer: OTHER GOVERNMENT

## 2022-05-24 PROCEDURE — 97110 THERAPEUTIC EXERCISES: CPT

## 2022-05-24 PROCEDURE — 97530 THERAPEUTIC ACTIVITIES: CPT

## 2022-05-24 NOTE — PROGRESS NOTES
[facility] at [address] [place] [address] Drive: [phone]   Fax: [phone]  Discharge Summary  Patient Name: Rehan Michaels : 1965   Medical   Diagnosis: Pain in right knee [M25.561] Treatment Diagnosis: Right knee pain   Onset Date: chronic x 10 years                 Referral Source: Marco Erickson MD Start of Care [facility]): 3/11/2022   Prior Hospitalization: See medical history Provider #: [id number]   Prior Level of Function: right knee pain/distal thigh pain x 10 year s/p right femur fracture; yoga, pilates, and doing low-impact workout to fitness DVD's 3x/week  Unrestricted with functional activities and ADLs   Comorbidities: right distal femur fracture 10 years ago (treated non-surgically), chronic right knee pain, left tibial shaft fracture 11 years ago (treated non-surgically), hypothyroid, bilateral carpal tunnel release, scoliosis, chronic LBP, partial thyroidectomy   Medications: Verified on Patient Summary List      ===========================================================================================  Assessment / Summary of Care:  Rehan Michaels is a 61 y.o. female with right knee pain. Patient is being discharged as she has met 1/1 STGs and 2/5 LTGs. She did not meet all LTGs due to severity of her condition prior to current episode of care. She has made significant gains in strength and functional mobility but is still limited by pain. She is compliant with HEP and plans to maintain her strength with independent exercise. Provided advanced land based and aquatic exercise programs to promote seamless transition to independent exercise.     ===========================================================================================    Plan: Discharge to independent HEP.      Goals:   Short Term Goals: to be accomplished within 2 weeks:     Patient will report compliance with prescribed HEP at least 1x/day in order to assist in progression towards goals and restore functional mobility. Eval: HEP to be issued and explained to patient within 1-2 visits  Current: Met, 5/24/2022     Long Term Goals: to be accomplished within 6 weeks:     Patient will score at least 63 points on FOTO in order to maximize function and promote patient satisfaction with overall outcome.  (FOTO is an established functional score where 100 = no disability)  Eval: 55  Current: Not met 57, 5/24/2022     Patient will demonstrate at least 5/5 strength bilaterally in order to be able to safely perform functional activities. Eval: Grossly 5/5 bilaterally except bilateral right quad 4+/5, hip abduction 4-/5, left hip ext with knee bent 4-/5, right hip ext with knee bent 4/5  Current: Met 5/5 globally, 5/24/2022     Patient will less than or equal to 2/10 pain during all functional activities and ADLs in order to improve QOL and return to patient's PLOF. Eval: pain ranges from 3-8, currently 5/10; limited in standing/walking > 20 mins, difficulty with sit <> stands and stair descents   Current: Not met patient reports pain 3-8/10 with ADLs, 5/24/2022     Patient will be able to negotiate a full flight of stairs with a step-through pattern and less than or equal to 2/10 pain in order to improve safety and return patient to her PLOF.               Eval: ascents step-through, descents step-to; up to 5/10 pain during descents              Current: Not met 3-6/10, 5/24/2022     Patient will be able to perform at least 15 reps of sit <> stands during 30\" STS test to demonstrate improved LE strength and stability during this functional activity, thus reducing the patient's risk of falls.              Eval: 11 reps, without use of arms               Current: Met, 16 reps, 5/24/2022    ===========================================================================================  Subjective: Patient reports that she had follow up with referring physician and was told that she is a surgical candidate. She states that she is compliant with HEP.       Objective:   FOTO 57  MMT 5/5  30 second STS 16 reps    Therapist Signature: Jose Lagunas PT, DPT Date: 5/24/2022     Time: 3:37 PM

## 2022-05-24 NOTE — PROGRESS NOTES
PT DAILY TREATMENT NOTE - [facility]     Patient Name: Crystal Jorgensen  Date:2022  : 1965  [x]  Patient  Verified  Payor:  / Plan: [facility]  [place] / Product Type:  /    In time:1435  Out time:1515  Total Treatment Time (min): 40  Total Timed Codes (min): 40   Visit #: 18 of 18    Treatment Area: Pain in right knee [M25.561]    SUBJECTIVE  Pain Level (0-10 scale): 1  Any medication changes, allergies to medications, adverse drug reactions, diagnosis change, or new procedure performed?: [x] No    [] Yes (see summary sheet for update)  Subjective functional status/changes:   [] No changes reported    Patient reports that she had follow up with referring physician and was told that she is a surgical candidate. She states that she is compliant with HEP. OBJECTIVE    30 min Therapeutic Exercise:  [x] See flow sheet :   Rationale: increase ROM, increase strength and decrease pain to improve the patients ability to complete ADLs    10 min Therapeutic Activity:  [x]  See flow sheet :   Rationale: increase ROM, increase strength and improve coordination  to improve the patients ability to complete ADLs         With   [] TE   [] TA   [] neuro   [] other: Patient Education: [x] Review HEP    [] Progressed/Changed HEP based on:   [] positioning   [] body mechanics   [] transfers   [] heat/ice application    [] other:      Other Objective/Functional Measures:   FOTO 57  MMT 5/5  30 second STS 16 reps     Pain Level (0-10 scale) post treatment: 1-2    ASSESSMENT/Changes in Function: Patient responds well to treatment session. No adverse effects were noted from today's treatment session. Patient is being discharged as she has met 1/1 STGs and 2/5 LTGs. She did not meet all LTGs due to severity of her condition proper to current episode of care. She has made significant gains in strength and functional mobility but is still limited by pain.  She is compliant with HEP and plans to maintain her strength with independent exercise. Provided advanced lambasted and aquatic exercise program to promote seamless transition to independent exercise. []  See Plan of Care  []  See progress note/recertification  []  See Discharge Summary         Progress towards goals / Updated goals:  Short Term Goals: to be accomplished within 2 weeks:     Patient will report compliance with prescribed HEP at least 1x/day in order to assist in progression towards goals and restore functional mobility. Eval: HEP to be issued and explained to patient within 1-2 visits  Current: Met, 5/24/2022     Long Term Goals: to be accomplished within 6 weeks:     Patient will score at least 63 points on FOTO in order to maximize function and promote patient satisfaction with overall outcome.  (FOTO is an established functional score where 100 = no disability)  Eval: 55  Current: Not met 57, 5/24/2022     Patient will demonstrate at least 5/5 strength bilaterally in order to be able to safely perform functional activities. Eval: Grossly 5/5 bilaterally except bilateral right quad 4+/5, hip abduction 4-/5, left hip ext with knee bent 4-/5, right hip ext with knee bent 4/5  Current: Met 5/5 globally, 5/24/2022     Patient will less than or equal to 2/10 pain during all functional activities and ADLs in order to improve QOL and return to patient's PLOF. Eval: pain ranges from 3-8, currently 5/10; limited in standing/walking > 20 mins, difficulty with sit <> stands and stair descents   Current: Not met patient reports pain 3-8/10 with ADLs, 5/24/2022     Patient will be able to negotiate a full flight of stairs with a step-through pattern and less than or equal to 2/10 pain in order to improve safety and return patient to her PLOF.               Eval: ascents step-through, descents step-to; up to 5/10 pain during descents              Current: Not met 3-6/10, 5/24/2022     Patient will be able to perform at least 15 reps of sit <> stands during 30\" STS test to demonstrate improved LE strength and stability during this functional activity, thus reducing the patient's risk of falls.              Eval: 11 reps, without use of arms               Current: Met, 16 reps, 5/24/2022    PLAN  []  Upgrade activities as tolerated     []  Continue plan of care  []  Update interventions per flow sheet       [x]  Discharge due to: Met maximum benefit from HEP, 3/24/2022  []  Other:_      Harsha Rolle, PT, DPT 5/24/2022  2:39 PM    No future appointments.